# Patient Record
Sex: MALE | Race: OTHER | NOT HISPANIC OR LATINO | Employment: UNEMPLOYED | URBAN - METROPOLITAN AREA
[De-identification: names, ages, dates, MRNs, and addresses within clinical notes are randomized per-mention and may not be internally consistent; named-entity substitution may affect disease eponyms.]

---

## 2017-02-12 ENCOUNTER — HOSPITAL ENCOUNTER (EMERGENCY)
Facility: HOSPITAL | Age: 32
Discharge: HOME | End: 2017-02-12
Attending: EMERGENCY MEDICINE
Payer: MEDICARE

## 2017-02-12 VITALS
TEMPERATURE: 98 F | WEIGHT: 240 LBS | RESPIRATION RATE: 16 BRPM | BODY MASS INDEX: 36.37 KG/M2 | HEART RATE: 70 BPM | HEIGHT: 68 IN | DIASTOLIC BLOOD PRESSURE: 74 MMHG | SYSTOLIC BLOOD PRESSURE: 133 MMHG | OXYGEN SATURATION: 100 %

## 2017-02-12 DIAGNOSIS — R10.9 ABDOMINAL PAIN, UNSPECIFIED LOCATION: Primary | ICD-10-CM

## 2017-02-12 DIAGNOSIS — K29.70 GASTRITIS, PRESENCE OF BLEEDING UNSPECIFIED, UNSPECIFIED CHRONICITY, UNSPECIFIED GASTRITIS TYPE: ICD-10-CM

## 2017-02-12 DIAGNOSIS — R31.9 HEMATURIA: ICD-10-CM

## 2017-02-12 DIAGNOSIS — R93.89 ABNORMAL FINDING ON CT SCAN: ICD-10-CM

## 2017-02-12 LAB
ALBUMIN SERPL BCP-MCNC: 4.3 G/DL
ALP SERPL-CCNC: 103 U/L
ALT SERPL W/O P-5'-P-CCNC: 35 U/L
ANION GAP SERPL CALC-SCNC: 9 MMOL/L
AST SERPL-CCNC: 25 U/L
BACTERIA #/AREA URNS HPF: ABNORMAL /HPF
BASOPHILS # BLD AUTO: 0.02 K/UL
BASOPHILS NFR BLD: 0.1 %
BILIRUB SERPL-MCNC: 0.5 MG/DL
BILIRUB UR QL STRIP: NEGATIVE
BUN SERPL-MCNC: 13 MG/DL
CALCIUM SERPL-MCNC: 11.7 MG/DL
CHLORIDE SERPL-SCNC: 107 MMOL/L
CLARITY UR: ABNORMAL
CO2 SERPL-SCNC: 23 MMOL/L
COLOR UR: YELLOW
CREAT SERPL-MCNC: 1.2 MG/DL
DIFFERENTIAL METHOD: ABNORMAL
EOSINOPHIL # BLD AUTO: 0 K/UL
EOSINOPHIL NFR BLD: 0.1 %
ERYTHROCYTE [DISTWIDTH] IN BLOOD BY AUTOMATED COUNT: 13.6 %
EST. GFR  (AFRICAN AMERICAN): >60 ML/MIN/1.73 M^2
EST. GFR  (NON AFRICAN AMERICAN): >60 ML/MIN/1.73 M^2
GLUCOSE SERPL-MCNC: 120 MG/DL
GLUCOSE UR QL STRIP: NEGATIVE
HCT VFR BLD AUTO: 39.2 %
HGB BLD-MCNC: 13 G/DL
HGB UR QL STRIP: ABNORMAL
HYALINE CASTS #/AREA URNS LPF: 0 /LPF
KETONES UR QL STRIP: NEGATIVE
LEUKOCYTE ESTERASE UR QL STRIP: NEGATIVE
LIPASE SERPL-CCNC: 15 U/L
LYMPHOCYTES # BLD AUTO: 0.9 K/UL
LYMPHOCYTES NFR BLD: 6.1 %
MCH RBC QN AUTO: 26.2 PG
MCHC RBC AUTO-ENTMCNC: 33.2 %
MCV RBC AUTO: 79 FL
MICROSCOPIC COMMENT: ABNORMAL
MONOCYTES # BLD AUTO: 0.5 K/UL
MONOCYTES NFR BLD: 3.3 %
NEUTROPHILS # BLD AUTO: 13.8 K/UL
NEUTROPHILS NFR BLD: 90.2 %
NITRITE UR QL STRIP: NEGATIVE
PH UR STRIP: 6 [PH] (ref 5–8)
PLATELET # BLD AUTO: 236 K/UL
PMV BLD AUTO: 12.1 FL
POTASSIUM SERPL-SCNC: 4.1 MMOL/L
PROT SERPL-MCNC: 8 G/DL
PROT UR QL STRIP: ABNORMAL
RBC # BLD AUTO: 4.96 M/UL
RBC #/AREA URNS HPF: 30 /HPF (ref 0–4)
SODIUM SERPL-SCNC: 139 MMOL/L
SP GR UR STRIP: 1.03 (ref 1–1.03)
SQUAMOUS #/AREA URNS HPF: 3 /HPF
URN SPEC COLLECT METH UR: ABNORMAL
UROBILINOGEN UR STRIP-ACNC: NEGATIVE EU/DL
WBC # BLD AUTO: 15.27 K/UL
WBC #/AREA URNS HPF: 1 /HPF (ref 0–5)

## 2017-02-12 PROCEDURE — 96361 HYDRATE IV INFUSION ADD-ON: CPT

## 2017-02-12 PROCEDURE — 99284 EMERGENCY DEPT VISIT MOD MDM: CPT | Mod: 25

## 2017-02-12 PROCEDURE — 25000003 PHARM REV CODE 250: Performed by: NURSE PRACTITIONER

## 2017-02-12 PROCEDURE — 81000 URINALYSIS NONAUTO W/SCOPE: CPT

## 2017-02-12 PROCEDURE — 63600175 PHARM REV CODE 636 W HCPCS: Performed by: NURSE PRACTITIONER

## 2017-02-12 PROCEDURE — 80053 COMPREHEN METABOLIC PANEL: CPT

## 2017-02-12 PROCEDURE — 83690 ASSAY OF LIPASE: CPT

## 2017-02-12 PROCEDURE — 96375 TX/PRO/DX INJ NEW DRUG ADDON: CPT

## 2017-02-12 PROCEDURE — 85025 COMPLETE CBC W/AUTO DIFF WBC: CPT

## 2017-02-12 PROCEDURE — 25500020 PHARM REV CODE 255: Performed by: EMERGENCY MEDICINE

## 2017-02-12 PROCEDURE — 96374 THER/PROPH/DIAG INJ IV PUSH: CPT

## 2017-02-12 RX ORDER — ONDANSETRON 2 MG/ML
4 INJECTION INTRAMUSCULAR; INTRAVENOUS
Status: COMPLETED | OUTPATIENT
Start: 2017-02-12 | End: 2017-02-12

## 2017-02-12 RX ORDER — KETOROLAC TROMETHAMINE 30 MG/ML
15 INJECTION, SOLUTION INTRAMUSCULAR; INTRAVENOUS
Status: COMPLETED | OUTPATIENT
Start: 2017-02-12 | End: 2017-02-12

## 2017-02-12 RX ORDER — ONDANSETRON 4 MG/1
4 TABLET, ORALLY DISINTEGRATING ORAL EVERY 8 HOURS PRN
Qty: 12 TABLET | Refills: 0 | Status: SHIPPED | OUTPATIENT
Start: 2017-02-12 | End: 2020-08-05

## 2017-02-12 RX ADMIN — ONDANSETRON 4 MG: 2 INJECTION INTRAMUSCULAR; INTRAVENOUS at 01:02

## 2017-02-12 RX ADMIN — SODIUM CHLORIDE 1000 ML: 0.9 INJECTION, SOLUTION INTRAVENOUS at 01:02

## 2017-02-12 RX ADMIN — KETOROLAC TROMETHAMINE 15 MG: 30 INJECTION, SOLUTION INTRAMUSCULAR at 01:02

## 2017-02-12 RX ADMIN — IOHEXOL 80 ML: 350 INJECTION, SOLUTION INTRAVENOUS at 02:02

## 2017-02-12 NOTE — ED PROVIDER NOTES
"Encounter Date: 2/12/2017    SCRIBE #1 NOTE: I, Mira Gallardo, am scribing for, and in the presence of,  Annette Young NP. I have scribed the following portions of the note - Other sections scribed: HPI/ROS.       History     Chief Complaint   Patient presents with    Abdominal Pain     right sided, went to urgent care and sent here to r/o appendicitis     Review of patient's allergies indicates:  No Known Allergies  HPI Comments: CC: Abdominal Pain    HPI: This 31 y.o. male with anxiety and depression presents to the ED with his mother c/o right-sided abdominal pain with associated nausea that he woke with this morning.  The patient reports since waking he has had 4 episodes of emesis.  The pain is described as constant and "sharp".  Treatment was attempted with metamucil which provided no relief, so he went to urgent care who advised him to come to the ED for evaluation.  He states he is normally constipated, but he had a BM this morning.  He did not eat breakfast this morning, but denies a decreased appetite.  The patient also denies fever, diarrhea or any other associated symptoms.   His immunizations are up to date and he has no known allergies.  He has no PCP.        The history is provided by the patient.     Past Medical History   Diagnosis Date    Anxiety     Depression      Past Medical History Pertinent Negatives   Diagnosis Date Noted    Diabetes mellitus 2/12/2017    GERD (gastroesophageal reflux disease) 2/12/2017     No past surgical history on file.  No family history on file.  Social History   Substance Use Topics    Smoking status: Never Smoker    Smokeless tobacco: None    Alcohol use No     Review of Systems   Constitutional: Negative for fever.   HENT: Negative for sore throat.    Eyes: Negative for visual disturbance.   Respiratory: Negative for shortness of breath.    Cardiovascular: Negative for chest pain.   Gastrointestinal: Positive for abdominal pain, constipation, nausea and " vomiting. Negative for diarrhea.   Genitourinary: Negative for dysuria and hematuria.   Musculoskeletal: Negative for neck pain.   Skin: Negative for rash.   Neurological: Negative for headaches.       Physical Exam   Initial Vitals   BP Pulse Resp Temp SpO2   02/12/17 1206 02/12/17 1206 02/12/17 1206 02/12/17 1206 02/12/17 1206   129/78 70 20 97.4 °F (36.3 °C) 100 %     Physical Exam    Nursing note and vitals reviewed.  Constitutional: He appears well-developed and well-nourished.   HENT:   Head: Normocephalic.   Somewhat dry m/m   Eyes: Conjunctivae are normal.   Neck: Normal range of motion. Neck supple.   Cardiovascular: Normal rate, regular rhythm and normal heart sounds.   Pulmonary/Chest: Breath sounds normal. No respiratory distress.   Abdominal: Soft. He exhibits no distension and no mass. There is tenderness. There is no rebound and no guarding.   Tender over mcburrys point. No rebound at this time.    Genitourinary: Penis normal.   Genitourinary Comments: bilat testes NTTP.  No swelling, no mass   Musculoskeletal: Normal range of motion.   Neurological: He is alert and oriented to person, place, and time.   Skin: Skin is warm and dry.   Psychiatric:   Odd affect, somewhat delayed in answering questions.          ED Course   Procedures  Labs Reviewed   CBC W/ AUTO DIFFERENTIAL - Abnormal; Notable for the following:        Result Value    WBC 15.27 (*)     Hemoglobin 13.0 (*)     Hematocrit 39.2 (*)     MCV 79 (*)     MCH 26.2 (*)     Gran # 13.8 (*)     Lymph # 0.9 (*)     Gran% 90.2 (*)     Lymph% 6.1 (*)     Mono% 3.3 (*)     All other components within normal limits   COMPREHENSIVE METABOLIC PANEL - Abnormal; Notable for the following:     Glucose 120 (*)     Calcium 11.7 (*)     All other components within normal limits   URINALYSIS - Abnormal; Notable for the following:     Appearance, UA Cloudy (*)     Protein, UA 1+ (*)     Occult Blood UA 3+ (*)     All other components within normal limits    URINALYSIS MICROSCOPIC - Abnormal; Notable for the following:     RBC, UA 30 (*)     All other components within normal limits   LIPASE             Medical Decision Making:   Initial Assessment:   31yr old male, with mental health history, presents with vomiting and RLQ pain since this am  Differential Diagnosis:   Appendicitis  Gastroenteritis  Testicular torsion  ED Management:  Pts exam was positive for pain over mcburrys point but no guarding, no rebound.   pt does not appear ill or toxic and is afebrile at this time.     NS 1L, zofran and toradol given with positive relief.  Pt has not vomited since arrival in ed.     Labs and CT were reviewed and discussed with pt and mother    Based on exam today - I have low suspicion for medical, surgical or other life threatening illness although s/s of when to rtn to ed were reviewed with both mother and pt. Most likely this is a viral gastritis but mother and pt are aware of s/s to watch for and return to er for.     Pt and mother verbalizes understanding of d/c instructions and will return for worsening condition.    Case discussed with attending who agrees with assessment and plan.               Scribe Attestation:   Scribe #1: I performed the above scribed service and the documentation accurately describes the services I performed. I attest to the accuracy of the note.    Attending Attestation:           Physician Attestation for Scribe:  Physician Attestation Statement for Scribe #1: I, Annette Young NP, reviewed documentation, as scribed by Mira marinelli in my presence, and it is both accurate and complete.                 ED Course     Clinical Impression:   The primary encounter diagnosis was Abdominal pain, unspecified location. Diagnoses of Hematuria, Abnormal finding on CT scan, and Gastritis, presence of bleeding unspecified, unspecified chronicity, unspecified gastritis type were also pertinent to this visit.    Disposition:   Disposition:  Discharged  Condition: Stable       Annette Young, YENNY  02/12/17 9100

## 2017-02-12 NOTE — ED TRIAGE NOTES
Pt presents to ED c/o abd pain and nv since he woke up this morning.  States he is also constipated.

## 2017-02-12 NOTE — DISCHARGE INSTRUCTIONS
Abdominal Pain    Abdominal pain is pain in the stomach or belly area. Everyone has this pain from time to time. In many cases it goes away on its own. But abdominal pain can sometimes be due to a serious problem, such as appendicitis. So its important to know when to seek help.  Causes of abdominal pain  There are many possible causes of abdominal pain. Common causes in adults include:  · Constipation, diarrhea, or gas  · Stomach acid flowing back up into the esophagus (acid reflux or heartburn)  · Severe acid reflux, called GERD (gastroesophageal reflux disease)  · A sore in the lining of the stomach or small intestine (peptic ulcer)  · Inflammation of the gallbladder, liver, or pancreas  · Gallstones or kidney stones  · Appendicitis   · Intestinal blockage   · An internal organ pushing through a muscle or other tissue (hernia)  · Urinary tract infections  · In women, menstrual cramps, fibroids, or endometriosis  · Inflammation or infection of the intestines  Diagnosing the cause of abdominal pain  Your healthcare provider will do a physical exam help find the cause of your pain. If needed, tests will be ordered. Belly pain has many possible causes. So it can be hard to find the reason for your pain. Giving details about your pain can help. Tell your provider where and when you feel the pain, and what makes it better or worse. Also let your provider know if you have other symptoms such as:  · Fever  · Tiredness  · Upset stomach (nausea)  · Vomiting  · Changes in bathroom habits  Treating abdominal pain  Some causes of pain need emergency medical treatment right away. These include appendicitis or a bowel blockage. Other problems can be treated with rest, fluids, or medicines. Your healthcare provider can give you specific instructions for treatment or self-care based on what is causing your pain.  If you have vomiting or diarrhea, sip water or other clear fluids. When you are ready to eat solid foods again,  start with small amounts of easy-to-digest, low-fat foods. These include apple sauce, toast, or crackers.   When to seek medical care  Call 911 or go to the hospital right away if you:  · Cant pass stool and are vomiting  · Are vomiting blood or have bloody diarrhea or black, tarry diarrhea  · Have chest, neck, or shoulder pain  · Feel like you might pass out  · Have pain in your shoulder blades with nausea  · Have sudden, severe belly pain  · Have new, severe pain unlike any you have felt before  · Have a belly that is rigid, hard, and tender to touch  Call your healthcare provider if you have:  · Pain for more than 5 days  · Bloating for more than 2 days  · Diarrhea for more than 5 days  · A fever of 100.4°F (38.0°C) or higher, or as directed by your provider  · Pain that gets worse  · Weight loss for no reason  · Continued lack of appetite  · Blood in your stool  How to prevent abdominal pain  Here are some tips to help prevent abdominal pain:  · Eat smaller amounts of food at one time.  · Avoid greasy, fried, or other high-fat foods.  · Avoid foods that give you gas.  · Exercise regularly.  · Drink plenty of fluids.  To help prevent GERD symptoms:  · Quit smoking.  · Reduce alcohol and certain foods that increase stomach acid.  · Avoid aspirin and over-the-counter pain and fever medicines (NSAIDS or nonsteroidal anti-inflammatory drugs), if possible  · Lose extra weight.  · Finish eating at least 2 hours before you go to bed or lie down.  · Raise the head of your bed.  Date Last Reviewed: 7/1/2016  © 4791-2542 [employer]. [address] [place] [address], [place] [address]. All rights reserved. This information is not intended as a substitute for professional medical care. Always follow your healthcare professional's instructions.          Gastritis (Adult)    Gastritis is inflammation and irritation of the stomach lining. It can be present for a short time (acute) or be long lasting (chronic). Gastritis  is often caused by infection with bacteria called H pylori. More than a third of people in the [place] have this bacteria in their bodies. In many cases, H pylori causes no problems or symptoms. In some people, though, the infection irritates the stomach lining and causes gastritis. Other causes of stomach irritation include drinking alcohol or taking pain-relieving medicines called NSAIDs (such as aspirin or ibuprofen).   Symptoms of gastritis can include:  · Abdominal pain or bloating  · Loss of appetite  · Nausea or vomiting  · Vomiting blood or having black stools  · Feeling more tired than usual  An inflamed and irritated stomach lining is more likely to develop a sore called an ulcer. To help prevent this, gastritis should be treated.  Home care  If needed, medicines may be prescribed. If you have H pylori infection, treating it will likely relieve your symptoms. Other changes can help reduce stomach irritation and help it heal.  · If you have been prescribed medicines for H pylori infection, take them as directed. Take all of the medicine until it is finished or your healthcare provider tells you to stop, even if you feel better.  · Your healthcare provider may recommend avoiding NSAIDs. If you take daily aspirin for your heart or other medical reasons, do not stop without talking to your healthcare provider first.  · Avoid drinking alcohol.  · Stop smoking. Smoking can irritate the stomach and delay healing. As much as possible, stay away from second hand smoke.  Follow-up care  Follow up with your healthcare provider, or as advised by our staff. Testing may be needed to check for inflammation or an ulcer.  When to seek medical advice  Call your healthcare provider for any of the following:  · Stomach pain that gets worse or moves to the lower right abdomen (appendix area)  · Chest pain that appears or gets worse, or spreads to the back, neck, shoulder, or arm  · Frequent vomiting (cant keep down  liquids)  · Blood in the stool or vomit (red or black in color)  · Feeling weak or dizzy  · Fever of 100.4ºF (38ºC) or higher, or as directed by your healthcare provider  Date Last Reviewed: 6/22/2015 © 2000-2016 [employer]. [address] [place] [address]. All rights reserved. This information is not intended as a substitute for professional medical care. Always follow your healthcare professional's instructions.          Hematuria: Possible Causes     Many things can lead to blood in the urine (hematuria). The blood may be seen with the eye. Or it may only be seen when the urine is looked at under a microscope. Some of the most common causes of blood in the urine are listed below. Often, no cause for the blood can be found. This is called idiopathic hematuria.  · Kidney or bladder stones are collections of crystals. They form in the urine. Stones may be found anywhere in the urinary tract. But they form most often in the kidneys or bladder. In addition to blood in the urine, they can cause severe pain.  · BPH stands for benign prostatic hyperplasia. It is enlargement of the prostate gland. It happens as men age. BPH often causes problems with urination. It sometimes causes blood in the urine.  · A urinary tract infection (UTI) is due to bacteria growing in the urinary tract. It can cause blood in the urine. Other symptoms include burning or pain with urination. You may need to urinate often or urgently. You may also have a fever.  · Damage to the urinary tract may cause blood in the urine. This damage may be due to a blow or accident. It may also result from the use of a urinary catheter. Very hard exercise may sometimes irritate the urinary tract and cause bleeding.  · Cancer may occur anywhere in the urinary tract. A tumor may sometimes cause no symptoms other than bleeding.     Other possible causes of bleeding include:  · Prostatitis (infection of the prostate gland)  · Taking  anticoagulant medications  · Blockage in the urinary tract  · Disease or inflammation of the kidney  · Cystic diseases of the kidneys  · Sickle cell anemia  · Tumors of the urinary tract  Date Last Reviewed: 9/12/2014  © 7702-8727 [employer]. [address] [place] [address], [place] [address]. All rights reserved. This information is not intended as a substitute for professional medical care. Always follow your healthcare professional's instructions.

## 2017-02-12 NOTE — ED AVS SNAPSHOT
[facility]  [place] [address]               Luis Andujar   2017 12:22 PM   ED    Description:  Male : 1985   Department:  [facility]           Your Care was Coordinated By:     Provider Role From To    Adonis Brown MD Attending Provider [phone] --    Annette Young NP Nurse Practitioner [phone] --      Reason for Visit     Abdominal Pain           Diagnoses this Visit        Comments    Abdominal pain, unspecified location    -  Primary     Hematuria         Abnormal finding on CT scan         Gastritis, presence of bleeding unspecified, unspecified chronicity, unspecified gastritis type           ED Disposition     None           To Do List           Follow-up Information     Follow up with Sravan Miller MD In 3 days.    Specialty:  Internal Medicine    Contact information:    [address] [place] [address]  [phone]          Follow up with [facility].    Specialty:  Emergency Medicine    Why:  If symptoms worsen or any other concerns    Contact information:    [place] [address] [phone]       These Medications        Disp Refills Start End    ondansetron (ZOFRAN-ODT) 4 MG TbDL 12 tablet 0 2017     Take 1 tablet (4 mg total) by mouth every 8 (eight) hours as needed. - Oral    Pharmacy: [facility] [id number] [place] [address] [address] [facility] Ph #: [phone]         [facility]     [facility] -  Assistance  Registered nurses in the [facility] provide clinical advisement, health education, appointment booking, and other advisory services.  Call for this free service at [phone].             Medications           Message regarding Medications     Verify the changes and/or additions to your medication regime listed below are the  same as discussed with your clinician today.  If any of these changes or additions are incorrect, please notify your healthcare provider.        START taking these NEW medications        Refills    ondansetron (ZOFRAN-ODT) 4 MG TbDL 0    Sig: Take 1 tablet (4 mg total) by mouth every 8 (eight) hours as needed.    Class: Print    Route: Oral      These medications were administered today        Dose Freq    sodium chloride 0.9% bolus 1,000 mL 1,000 mL Once    Sig: Inject 1,000 mLs into the vein once.    Class: Normal    Route: Intravenous    ondansetron injection 4 mg 4 mg ED 1 Time    Sig: Inject 4 mg into the vein ED 1 Time.    Class: Normal    Route: Intravenous    ketorolac injection 15 mg 15 mg ED 1 Time    Sig: Inject 15 mg into the vein ED 1 Time.    Class: Normal    Route: Intravenous    omnipaque 350 iohexol 80 mL 80 mL IMG once as needed    Sig: Inject 80 mLs into the vein ONCE PRN for contrast.    Class: Normal    Route: Intravenous           Verify that the below list of medications is an accurate representation of the medications you are currently taking.  If none reported, the list may be blank. If incorrect, please contact your healthcare provider. Carry this list with you in case of emergency.           Current Medications     aripiprazole (ABILIFY) 20 MG Tab Take 20 mg by mouth once daily.    clotrimazole-betamethasone 1-0.05% (LOTRISONE) cream     desonide (DESOWEN) 0.05 % cream APPLY TO THE AFFECTED AREA TWICE DAILY    FLUVIRIN 3518-4604 45 mcg (15 mcg x 3)/0.5 mL Susp     hydrOXYzine (ATARAX) 25 MG tablet Take 1 tablet (25 mg total) by mouth every 8 (eight) hours as needed for Itching.    methylPREDNISolone (MEDROL DOSEPACK) 4 mg tablet Use as directed    olanzapine (ZYPREXA) 5 MG tablet Take 5 mg by mouth every evening.    ondansetron (ZOFRAN-ODT) 4 MG TbDL Take 1 tablet (4 mg total) by mouth every 8 (eight) hours as needed.           Clinical Reference Information           Your Vitals Were      "BP Pulse Temp Resp Height Weight    133/74 (BP Location: Right arm, Patient Position: Sitting, BP Method: Automatic) 70 97.9 °F (36.6 °C) (Oral) 16 5' 8" (1.727 m) 108.9 kg (240 lb)    SpO2 BMI             100% 36.49 kg/m2         Allergies as of 2/12/2017     No Known Allergies      Immunizations Administered on Date of Encounter - 2/12/2017     None      ED Micro, Lab, POCT     Start Ordered       Status Ordering Provider    02/12/17 1232 02/12/17 1232  CBC W/ AUTO DIFFERENTIAL  Once      Final result     02/12/17 1232 02/12/17 1232  Comp. Metabolic Panel  STAT      Final result     02/12/17 1232 02/12/17 1232  Lipase  STAT      Final result     02/12/17 1232 02/12/17 1232  Urinalysis - Clean Catch  STAT      Final result     02/12/17 1232 02/12/17 1232  Urinalysis Microscopic  Once      Final result       ED Imaging Orders     Start Ordered       Status Ordering Provider    02/12/17 1233 02/12/17 1232  CT Abdomen Pelvis With Contrast  1 time imaging      Final result         Discharge Instructions         Abdominal Pain    Abdominal pain is pain in the stomach or belly area. Everyone has this pain from time to time. In many cases it goes away on its own. But abdominal pain can sometimes be due to a serious problem, such as appendicitis. So its important to know when to seek help.  Causes of abdominal pain  There are many possible causes of abdominal pain. Common causes in adults include:  · Constipation, diarrhea, or gas  · Stomach acid flowing back up into the esophagus (acid reflux or heartburn)  · Severe acid reflux, called GERD (gastroesophageal reflux disease)  · A sore in the lining of the stomach or small intestine (peptic ulcer)  · Inflammation of the gallbladder, liver, or pancreas  · Gallstones or kidney stones  · Appendicitis   · Intestinal blockage   · An internal organ pushing through a muscle or other tissue (hernia)  · Urinary tract infections  · In women, menstrual cramps, fibroids, or " endometriosis  · Inflammation or infection of the intestines  Diagnosing the cause of abdominal pain  Your healthcare provider will do a physical exam help find the cause of your pain. If needed, tests will be ordered. Belly pain has many possible causes. So it can be hard to find the reason for your pain. Giving details about your pain can help. Tell your provider where and when you feel the pain, and what makes it better or worse. Also let your provider know if you have other symptoms such as:  · Fever  · Tiredness  · Upset stomach (nausea)  · Vomiting  · Changes in bathroom habits  Treating abdominal pain  Some causes of pain need emergency medical treatment right away. These include appendicitis or a bowel blockage. Other problems can be treated with rest, fluids, or medicines. Your healthcare provider can give you specific instructions for treatment or self-care based on what is causing your pain.  If you have vomiting or diarrhea, sip water or other clear fluids. When you are ready to eat solid foods again, start with small amounts of easy-to-digest, low-fat foods. These include apple sauce, toast, or crackers.   When to seek medical care  Call 911 or go to the hospital right away if you:  · Cant pass stool and are vomiting  · Are vomiting blood or have bloody diarrhea or black, tarry diarrhea  · Have chest, neck, or shoulder pain  · Feel like you might pass out  · Have pain in your shoulder blades with nausea  · Have sudden, severe belly pain  · Have new, severe pain unlike any you have felt before  · Have a belly that is rigid, hard, and tender to touch  Call your healthcare provider if you have:  · Pain for more than 5 days  · Bloating for more than 2 days  · Diarrhea for more than 5 days  · A fever of 100.4°F (38.0°C) or higher, or as directed by your provider  · Pain that gets worse  · Weight loss for no reason  · Continued lack of appetite  · Blood in your stool  How to prevent abdominal pain  Here are  some tips to help prevent abdominal pain:  · Eat smaller amounts of food at one time.  · Avoid greasy, fried, or other high-fat foods.  · Avoid foods that give you gas.  · Exercise regularly.  · Drink plenty of fluids.  To help prevent GERD symptoms:  · Quit smoking.  · Reduce alcohol and certain foods that increase stomach acid.  · Avoid aspirin and over-the-counter pain and fever medicines (NSAIDS or nonsteroidal anti-inflammatory drugs), if possible  · Lose extra weight.  · Finish eating at least 2 hours before you go to bed or lie down.  · Raise the head of your bed.  Date Last Reviewed: 7/1/2016 © 2000-2016 [employer]. [address] [place] [address], [place] [address]. All rights reserved. This information is not intended as a substitute for professional medical care. Always follow your healthcare professional's instructions.          Gastritis (Adult)    Gastritis is inflammation and irritation of the stomach lining. It can be present for a short time (acute) or be long lasting (chronic). Gastritis is often caused by infection with bacteria called H pylori. More than a third of people in the [place] have this bacteria in their bodies. In many cases, H pylori causes no problems or symptoms. In some people, though, the infection irritates the stomach lining and causes gastritis. Other causes of stomach irritation include drinking alcohol or taking pain-relieving medicines called NSAIDs (such as aspirin or ibuprofen).   Symptoms of gastritis can include:  · Abdominal pain or bloating  · Loss of appetite  · Nausea or vomiting  · Vomiting blood or having black stools  · Feeling more tired than usual  An inflamed and irritated stomach lining is more likely to develop a sore called an ulcer. To help prevent this, gastritis should be treated.  Home care  If needed, medicines may be prescribed. If you have H pylori infection, treating it will likely relieve your symptoms. Other changes can help reduce stomach  irritation and help it heal.  · If you have been prescribed medicines for H pylori infection, take them as directed. Take all of the medicine until it is finished or your healthcare provider tells you to stop, even if you feel better.  · Your healthcare provider may recommend avoiding NSAIDs. If you take daily aspirin for your heart or other medical reasons, do not stop without talking to your healthcare provider first.  · Avoid drinking alcohol.  · Stop smoking. Smoking can irritate the stomach and delay healing. As much as possible, stay away from second hand smoke.  Follow-up care  Follow up with your healthcare provider, or as advised by our staff. Testing may be needed to check for inflammation or an ulcer.  When to seek medical advice  Call your healthcare provider for any of the following:  · Stomach pain that gets worse or moves to the lower right abdomen (appendix area)  · Chest pain that appears or gets worse, or spreads to the back, neck, shoulder, or arm  · Frequent vomiting (cant keep down liquids)  · Blood in the stool or vomit (red or black in color)  · Feeling weak or dizzy  · Fever of 100.4ºF (38ºC) or higher, or as directed by your healthcare provider  Date Last Reviewed: 6/22/2015 © 2000-2016 [employer]. [address] [place] [address], [place] [address]. All rights reserved. This information is not intended as a substitute for professional medical care. Always follow your healthcare professional's instructions.          Hematuria: Possible Causes     Many things can lead to blood in the urine (hematuria). The blood may be seen with the eye. Or it may only be seen when the urine is looked at under a microscope. Some of the most common causes of blood in the urine are listed below. Often, no cause for the blood can be found. This is called idiopathic hematuria.  · Kidney or bladder stones are collections of crystals. They form in the urine. Stones may be found anywhere in the urinary tract.  But they form most often in the kidneys or bladder. In addition to blood in the urine, they can cause severe pain.  · BPH stands for benign prostatic hyperplasia. It is enlargement of the prostate gland. It happens as men age. BPH often causes problems with urination. It sometimes causes blood in the urine.  · A urinary tract infection (UTI) is due to bacteria growing in the urinary tract. It can cause blood in the urine. Other symptoms include burning or pain with urination. You may need to urinate often or urgently. You may also have a fever.  · Damage to the urinary tract may cause blood in the urine. This damage may be due to a blow or accident. It may also result from the use of a urinary catheter. Very hard exercise may sometimes irritate the urinary tract and cause bleeding.  · Cancer may occur anywhere in the urinary tract. A tumor may sometimes cause no symptoms other than bleeding.     Other possible causes of bleeding include:  · Prostatitis (infection of the prostate gland)  · Taking anticoagulant medications  · Blockage in the urinary tract  · Disease or inflammation of the kidney  · Cystic diseases of the kidneys  · Sickle cell anemia  · Tumors of the urinary tract  Date Last Reviewed: 9/12/2014  © 8742-9688 [employer]. [address] [place] [address]. All rights reserved. This information is not intended as a substitute for professional medical care. Always follow your healthcare professional's instructions.          [facility] Sign-Up     Activating your [facility] account is as easy as 1-2-3!     1) Visit [email or web address], select Sign Up Now, enter this activation code and your date of birth, then select Next.  [email or web address]  Expires: 3/29/2017  3:10 PM      2) Create a username and password to use when you visit [facility] in the future and select a security question in case you lose your password and select Next.    3) Enter your e-mail address and click Sign  Up!    Additional Information  If you have questions, please e-mail [email or web address] or call [phone] to talk to our [facility] staff. Remember, [facility] is NOT to be used for urgent needs. For medical emergencies, dial 911.          [facility] complies with applicable Federal civil rights laws and does not discriminate on the basis of race, color, national origin, age, disability, or sex.        Language Assistance Services     ATTENTION: Language assistance services are available, free of charge. Please call [phone].      ATENCIÓN: Si habla español, tiene a ivory disposición servicios gratuitos de asistencia lingüística. Llame al [phone].     CHÚ Ý: N?u b?n nói Ti?ng Vi?t, có các d?ch v? h? tr? ngôn ng? mi?n phí dành cho b?n. G?i s? [phone].

## 2017-12-27 ENCOUNTER — TELEPHONE (OUTPATIENT)
Dept: PSYCHIATRY | Facility: CLINIC | Age: 32
End: 2017-12-27

## 2017-12-27 NOTE — TELEPHONE ENCOUNTER
----- Message from Layne Springer RN sent at 12/27/2017  9:08 AM CST -----  Contact: Pt [facility] portal      ----- Message -----  From: Naa Brooke  Sent: 12/26/2017   4:27 PM  To: Nelsy PARKS Staff    Appointment Request From: Luis Andujar    With Provider: Other - (see comments)    Would Accept With:Request to see a new provider    Preferred Date Range: From 12/25/2017 To 1/4/2018    Preferred Times: Any    Reason for visit: Request an Appt    Comments:  I would like to see a Psychiatrist, Dr. Whittington if possible.  I am trying to change my provider(s) to [facility].  Please let me know if you need additional information.  Thanks    Pt can be reached at [phone].

## 2018-02-02 ENCOUNTER — TELEPHONE (OUTPATIENT)
Dept: PSYCHIATRY | Facility: CLINIC | Age: 33
End: 2018-02-02

## 2018-02-02 ENCOUNTER — TELEPHONE (OUTPATIENT)
Dept: PSYCHIATRY | Facility: HOSPITAL | Age: 33
End: 2018-02-02

## 2018-02-03 NOTE — TELEPHONE ENCOUNTER
----- Message from Sue Krishna sent at 2/2/2018  6:17 PM CST -----  Contact: PT  PT is calling regarding the need to get an appointment.   PT feels its urgent, current Dr that he has he's stating it's not working out.    Callback: [phone]

## 2018-02-03 NOTE — TELEPHONE ENCOUNTER
----- Message from Izzy Mccormack MD sent at 2/2/2018  8:42 PM CST -----  Contact: PT    Called patient back on 2/2/18 at 08:46. No answer. No voicemail set up.      ----- Message -----  From: Sue Krishna  Sent: 2/2/2018   6:17 PM  To: Ksenia Magana Staff    PT is calling regarding the need to get an appointment.   PT feels its urgent, current Dr that he has he's stating it's not working out.    Callback: [phone]

## 2018-02-03 NOTE — TELEPHONE ENCOUNTER
I paged Dr Chris Rubio, resident on call to contact pt to clarify this message and make sure pt not in need of emergency care currently.

## 2018-02-04 NOTE — TELEPHONE ENCOUNTER
Dr Christopher's telephone conversation with Mr Andujar was reviewed; he denied any suicidal ideations or need for ER evaluation.  He plans to follow up at [facility] on Monday to discuss recent med concerns.

## 2020-07-22 ENCOUNTER — PATIENT OUTREACH (OUTPATIENT)
Dept: ADMINISTRATIVE | Facility: HOSPITAL | Age: 35
End: 2020-07-22

## 2020-08-05 ENCOUNTER — OFFICE VISIT (OUTPATIENT)
Dept: FAMILY MEDICINE | Facility: CLINIC | Age: 35
End: 2020-08-05
Payer: COMMERCIAL

## 2020-08-05 ENCOUNTER — LAB VISIT (OUTPATIENT)
Dept: LAB | Facility: HOSPITAL | Age: 35
End: 2020-08-05
Attending: FAMILY MEDICINE
Payer: COMMERCIAL

## 2020-08-05 VITALS
HEART RATE: 77 BPM | HEIGHT: 68 IN | RESPIRATION RATE: 16 BRPM | OXYGEN SATURATION: 98 % | SYSTOLIC BLOOD PRESSURE: 128 MMHG | DIASTOLIC BLOOD PRESSURE: 90 MMHG | BODY MASS INDEX: 38.32 KG/M2 | TEMPERATURE: 99 F | WEIGHT: 252.88 LBS

## 2020-08-05 DIAGNOSIS — Z00.00 WELL ADULT EXAM: Primary | ICD-10-CM

## 2020-08-05 DIAGNOSIS — Z00.00 WELL ADULT EXAM: ICD-10-CM

## 2020-08-05 DIAGNOSIS — J30.9 CHRONIC ALLERGIC RHINITIS: ICD-10-CM

## 2020-08-05 LAB
25(OH)D3+25(OH)D2 SERPL-MCNC: 22 NG/ML (ref 30–96)
ALBUMIN SERPL BCP-MCNC: 4.3 G/DL (ref 3.5–5.2)
ALP SERPL-CCNC: 90 U/L (ref 55–135)
ALT SERPL W/O P-5'-P-CCNC: 29 U/L (ref 10–44)
ANION GAP SERPL CALC-SCNC: 7 MMOL/L (ref 8–16)
AST SERPL-CCNC: 21 U/L (ref 10–40)
BASOPHILS # BLD AUTO: 0.06 K/UL (ref 0–0.2)
BASOPHILS NFR BLD: 0.7 % (ref 0–1.9)
BILIRUB SERPL-MCNC: 0.3 MG/DL (ref 0.1–1)
BUN SERPL-MCNC: 7 MG/DL (ref 6–20)
CALCIUM SERPL-MCNC: 11.1 MG/DL (ref 8.7–10.5)
CHLORIDE SERPL-SCNC: 106 MMOL/L (ref 95–110)
CHOLEST SERPL-MCNC: 222 MG/DL (ref 120–199)
CHOLEST/HDLC SERPL: 4.6 {RATIO} (ref 2–5)
CO2 SERPL-SCNC: 25 MMOL/L (ref 23–29)
CREAT SERPL-MCNC: 0.9 MG/DL (ref 0.5–1.4)
DIFFERENTIAL METHOD: ABNORMAL
EOSINOPHIL # BLD AUTO: 0.3 K/UL (ref 0–0.5)
EOSINOPHIL NFR BLD: 3.9 % (ref 0–8)
ERYTHROCYTE [DISTWIDTH] IN BLOOD BY AUTOMATED COUNT: 13.3 % (ref 11.5–14.5)
EST. GFR  (AFRICAN AMERICAN): >60 ML/MIN/1.73 M^2
EST. GFR  (NON AFRICAN AMERICAN): >60 ML/MIN/1.73 M^2
GLUCOSE SERPL-MCNC: 98 MG/DL (ref 70–110)
HCT VFR BLD AUTO: 45.1 % (ref 40–54)
HDLC SERPL-MCNC: 48 MG/DL (ref 40–75)
HDLC SERPL: 21.6 % (ref 20–50)
HGB BLD-MCNC: 14 G/DL (ref 14–18)
IMM GRANULOCYTES # BLD AUTO: 0.02 K/UL (ref 0–0.04)
IMM GRANULOCYTES NFR BLD AUTO: 0.2 % (ref 0–0.5)
LDLC SERPL CALC-MCNC: 149 MG/DL (ref 63–159)
LYMPHOCYTES # BLD AUTO: 2.4 K/UL (ref 1–4.8)
LYMPHOCYTES NFR BLD: 27.2 % (ref 18–48)
MCH RBC QN AUTO: 26.6 PG (ref 27–31)
MCHC RBC AUTO-ENTMCNC: 31 G/DL (ref 32–36)
MCV RBC AUTO: 86 FL (ref 82–98)
MONOCYTES # BLD AUTO: 0.5 K/UL (ref 0.3–1)
MONOCYTES NFR BLD: 5.5 % (ref 4–15)
NEUTROPHILS # BLD AUTO: 5.4 K/UL (ref 1.8–7.7)
NEUTROPHILS NFR BLD: 62.5 % (ref 38–73)
NONHDLC SERPL-MCNC: 174 MG/DL
NRBC BLD-RTO: 0 /100 WBC
PLATELET # BLD AUTO: 248 K/UL (ref 150–350)
PMV BLD AUTO: 12.3 FL (ref 9.2–12.9)
POTASSIUM SERPL-SCNC: 4.2 MMOL/L (ref 3.5–5.1)
PROT SERPL-MCNC: 7.9 G/DL (ref 6–8.4)
RBC # BLD AUTO: 5.27 M/UL (ref 4.6–6.2)
SODIUM SERPL-SCNC: 138 MMOL/L (ref 136–145)
T4 FREE SERPL-MCNC: 1.07 NG/DL (ref 0.71–1.51)
TRIGL SERPL-MCNC: 125 MG/DL (ref 30–150)
TSH SERPL DL<=0.005 MIU/L-ACNC: 2.43 UIU/ML (ref 0.4–4)
WBC # BLD AUTO: 8.68 K/UL (ref 3.9–12.7)

## 2020-08-05 PROCEDURE — 99999 PR PBB SHADOW E&M-EST. PATIENT-LVL III: ICD-10-PCS | Mod: PBBFAC,,, | Performed by: FAMILY MEDICINE

## 2020-08-05 PROCEDURE — 80053 COMPREHEN METABOLIC PANEL: CPT

## 2020-08-05 PROCEDURE — 82306 VITAMIN D 25 HYDROXY: CPT

## 2020-08-05 PROCEDURE — 99385 PREV VISIT NEW AGE 18-39: CPT | Mod: S$GLB,,, | Performed by: FAMILY MEDICINE

## 2020-08-05 PROCEDURE — 85025 COMPLETE CBC W/AUTO DIFF WBC: CPT

## 2020-08-05 PROCEDURE — 83036 HEMOGLOBIN GLYCOSYLATED A1C: CPT

## 2020-08-05 PROCEDURE — 36415 COLL VENOUS BLD VENIPUNCTURE: CPT | Mod: PO

## 2020-08-05 PROCEDURE — 84443 ASSAY THYROID STIM HORMONE: CPT

## 2020-08-05 PROCEDURE — 84439 ASSAY OF FREE THYROXINE: CPT

## 2020-08-05 PROCEDURE — 99999 PR PBB SHADOW E&M-EST. PATIENT-LVL III: CPT | Mod: PBBFAC,,, | Performed by: FAMILY MEDICINE

## 2020-08-05 PROCEDURE — 99385 PR PREVENTIVE VISIT,NEW,18-39: ICD-10-PCS | Mod: S$GLB,,, | Performed by: FAMILY MEDICINE

## 2020-08-05 PROCEDURE — 80061 LIPID PANEL: CPT

## 2020-08-05 RX ORDER — SERTRALINE HYDROCHLORIDE 100 MG/1
TABLET, FILM COATED ORAL DAILY
COMMUNITY
Start: 2020-06-30 | End: 2021-10-22 | Stop reason: SDUPTHER

## 2020-08-05 RX ORDER — METHYLPREDNISOLONE 4 MG/1
TABLET ORAL
Qty: 1 PACKAGE | Refills: 0 | Status: SHIPPED | OUTPATIENT
Start: 2020-08-05 | End: 2020-10-16 | Stop reason: SDUPTHER

## 2020-08-05 RX ORDER — CETIRIZINE HYDROCHLORIDE 10 MG/1
CAPSULE, LIQUID FILLED ORAL
COMMUNITY

## 2020-08-06 LAB
ESTIMATED AVG GLUCOSE: 111 MG/DL (ref 68–131)
HBA1C MFR BLD HPLC: 5.5 % (ref 4–5.6)

## 2020-10-15 DIAGNOSIS — J30.9 CHRONIC ALLERGIC RHINITIS: ICD-10-CM

## 2020-10-16 RX ORDER — METHYLPREDNISOLONE 4 MG/1
TABLET ORAL
Qty: 1 PACKAGE | Refills: 0 | Status: SHIPPED | OUTPATIENT
Start: 2020-10-16 | End: 2021-10-07

## 2020-10-16 RX ORDER — METHYLPREDNISOLONE 4 MG/1
TABLET ORAL
Qty: 1 PACKAGE | Refills: 0 | OUTPATIENT
Start: 2020-10-16

## 2020-10-16 NOTE — TELEPHONE ENCOUNTER
I spoke with Dr Lombard regarding this refill request and he stated to forward to him and he would refill it

## 2021-04-26 ENCOUNTER — PATIENT MESSAGE (OUTPATIENT)
Dept: RESEARCH | Facility: HOSPITAL | Age: 36
End: 2021-04-26

## 2021-09-22 ENCOUNTER — TELEPHONE (OUTPATIENT)
Dept: FAMILY MEDICINE | Facility: CLINIC | Age: 36
End: 2021-09-22

## 2021-10-07 ENCOUNTER — OFFICE VISIT (OUTPATIENT)
Dept: PSYCHIATRY | Facility: CLINIC | Age: 36
End: 2021-10-07
Payer: COMMERCIAL

## 2021-10-07 VITALS
BODY MASS INDEX: 37.5 KG/M2 | SYSTOLIC BLOOD PRESSURE: 139 MMHG | OXYGEN SATURATION: 97 % | HEIGHT: 68 IN | WEIGHT: 247.44 LBS | DIASTOLIC BLOOD PRESSURE: 88 MMHG | HEART RATE: 98 BPM

## 2021-10-07 DIAGNOSIS — F41.9 ANXIETY: Primary | ICD-10-CM

## 2021-10-07 PROCEDURE — 3079F DIAST BP 80-89 MM HG: CPT | Mod: CPTII,S$GLB,, | Performed by: PHYSICIAN ASSISTANT

## 2021-10-07 PROCEDURE — 99205 OFFICE O/P NEW HI 60 MIN: CPT | Mod: S$GLB,,, | Performed by: PHYSICIAN ASSISTANT

## 2021-10-07 PROCEDURE — 3008F PR BODY MASS INDEX (BMI) DOCUMENTED: ICD-10-PCS | Mod: CPTII,S$GLB,, | Performed by: PHYSICIAN ASSISTANT

## 2021-10-07 PROCEDURE — 1160F RVW MEDS BY RX/DR IN RCRD: CPT | Mod: CPTII,S$GLB,, | Performed by: PHYSICIAN ASSISTANT

## 2021-10-07 PROCEDURE — 3079F PR MOST RECENT DIASTOLIC BLOOD PRESSURE 80-89 MM HG: ICD-10-PCS | Mod: CPTII,S$GLB,, | Performed by: PHYSICIAN ASSISTANT

## 2021-10-07 PROCEDURE — 3075F SYST BP GE 130 - 139MM HG: CPT | Mod: CPTII,S$GLB,, | Performed by: PHYSICIAN ASSISTANT

## 2021-10-07 PROCEDURE — 99999 PR PBB SHADOW E&M-EST. PATIENT-LVL III: CPT | Mod: PBBFAC,,, | Performed by: PHYSICIAN ASSISTANT

## 2021-10-07 PROCEDURE — 3008F BODY MASS INDEX DOCD: CPT | Mod: CPTII,S$GLB,, | Performed by: PHYSICIAN ASSISTANT

## 2021-10-07 PROCEDURE — 1159F PR MEDICATION LIST DOCUMENTED IN MEDICAL RECORD: ICD-10-PCS | Mod: CPTII,S$GLB,, | Performed by: PHYSICIAN ASSISTANT

## 2021-10-07 PROCEDURE — 99205 PR OFFICE/OUTPT VISIT, NEW, LEVL V, 60-74 MIN: ICD-10-PCS | Mod: S$GLB,,, | Performed by: PHYSICIAN ASSISTANT

## 2021-10-07 PROCEDURE — 3075F PR MOST RECENT SYSTOLIC BLOOD PRESS GE 130-139MM HG: ICD-10-PCS | Mod: CPTII,S$GLB,, | Performed by: PHYSICIAN ASSISTANT

## 2021-10-07 PROCEDURE — 1159F MED LIST DOCD IN RCRD: CPT | Mod: CPTII,S$GLB,, | Performed by: PHYSICIAN ASSISTANT

## 2021-10-07 PROCEDURE — 1160F PR REVIEW ALL MEDS BY PRESCRIBER/CLIN PHARMACIST DOCUMENTED: ICD-10-PCS | Mod: CPTII,S$GLB,, | Performed by: PHYSICIAN ASSISTANT

## 2021-10-07 PROCEDURE — 99999 PR PBB SHADOW E&M-EST. PATIENT-LVL III: ICD-10-PCS | Mod: PBBFAC,,, | Performed by: PHYSICIAN ASSISTANT

## 2021-10-14 ENCOUNTER — PATIENT MESSAGE (OUTPATIENT)
Dept: PSYCHIATRY | Facility: CLINIC | Age: 36
End: 2021-10-14

## 2021-10-18 ENCOUNTER — PATIENT MESSAGE (OUTPATIENT)
Dept: PSYCHIATRY | Facility: CLINIC | Age: 36
End: 2021-10-18

## 2021-10-22 ENCOUNTER — OFFICE VISIT (OUTPATIENT)
Dept: PSYCHIATRY | Facility: CLINIC | Age: 36
End: 2021-10-22
Payer: COMMERCIAL

## 2021-10-22 VITALS
HEART RATE: 82 BPM | WEIGHT: 249.56 LBS | DIASTOLIC BLOOD PRESSURE: 84 MMHG | OXYGEN SATURATION: 97 % | BODY MASS INDEX: 37.82 KG/M2 | HEIGHT: 68 IN | SYSTOLIC BLOOD PRESSURE: 125 MMHG

## 2021-10-22 DIAGNOSIS — F25.1 SCHIZOAFFECTIVE DISORDER, DEPRESSIVE TYPE: Primary | ICD-10-CM

## 2021-10-22 DIAGNOSIS — F41.8 OTHER SPECIFIED ANXIETY DISORDERS: ICD-10-CM

## 2021-10-22 PROCEDURE — 3074F SYST BP LT 130 MM HG: CPT | Mod: CPTII,S$GLB,, | Performed by: PHYSICIAN ASSISTANT

## 2021-10-22 PROCEDURE — 3079F DIAST BP 80-89 MM HG: CPT | Mod: CPTII,S$GLB,, | Performed by: PHYSICIAN ASSISTANT

## 2021-10-22 PROCEDURE — 99214 PR OFFICE/OUTPT VISIT, EST, LEVL IV, 30-39 MIN: ICD-10-PCS | Mod: S$GLB,,, | Performed by: PHYSICIAN ASSISTANT

## 2021-10-22 PROCEDURE — 1160F PR REVIEW ALL MEDS BY PRESCRIBER/CLIN PHARMACIST DOCUMENTED: ICD-10-PCS | Mod: CPTII,S$GLB,, | Performed by: PHYSICIAN ASSISTANT

## 2021-10-22 PROCEDURE — 1160F RVW MEDS BY RX/DR IN RCRD: CPT | Mod: CPTII,S$GLB,, | Performed by: PHYSICIAN ASSISTANT

## 2021-10-22 PROCEDURE — 3079F PR MOST RECENT DIASTOLIC BLOOD PRESSURE 80-89 MM HG: ICD-10-PCS | Mod: CPTII,S$GLB,, | Performed by: PHYSICIAN ASSISTANT

## 2021-10-22 PROCEDURE — 3074F PR MOST RECENT SYSTOLIC BLOOD PRESSURE < 130 MM HG: ICD-10-PCS | Mod: CPTII,S$GLB,, | Performed by: PHYSICIAN ASSISTANT

## 2021-10-22 PROCEDURE — 90833 PSYTX W PT W E/M 30 MIN: CPT | Mod: S$GLB,,, | Performed by: PHYSICIAN ASSISTANT

## 2021-10-22 PROCEDURE — 1159F PR MEDICATION LIST DOCUMENTED IN MEDICAL RECORD: ICD-10-PCS | Mod: CPTII,S$GLB,, | Performed by: PHYSICIAN ASSISTANT

## 2021-10-22 PROCEDURE — 90833 PR PSYCHOTHERAPY W/PATIENT W/E&M, 30 MIN (ADD ON): ICD-10-PCS | Mod: S$GLB,,, | Performed by: PHYSICIAN ASSISTANT

## 2021-10-22 PROCEDURE — 3008F PR BODY MASS INDEX (BMI) DOCUMENTED: ICD-10-PCS | Mod: CPTII,S$GLB,, | Performed by: PHYSICIAN ASSISTANT

## 2021-10-22 PROCEDURE — 1159F MED LIST DOCD IN RCRD: CPT | Mod: CPTII,S$GLB,, | Performed by: PHYSICIAN ASSISTANT

## 2021-10-22 PROCEDURE — 99999 PR PBB SHADOW E&M-EST. PATIENT-LVL III: ICD-10-PCS | Mod: PBBFAC,,, | Performed by: PHYSICIAN ASSISTANT

## 2021-10-22 PROCEDURE — 3008F BODY MASS INDEX DOCD: CPT | Mod: CPTII,S$GLB,, | Performed by: PHYSICIAN ASSISTANT

## 2021-10-22 PROCEDURE — 99214 OFFICE O/P EST MOD 30 MIN: CPT | Mod: S$GLB,,, | Performed by: PHYSICIAN ASSISTANT

## 2021-10-22 PROCEDURE — 99999 PR PBB SHADOW E&M-EST. PATIENT-LVL III: CPT | Mod: PBBFAC,,, | Performed by: PHYSICIAN ASSISTANT

## 2021-10-22 RX ORDER — ARIPIPRAZOLE 20 MG/1
20 TABLET ORAL DAILY
Qty: 90 TABLET | Refills: 3 | Status: SHIPPED | OUTPATIENT
Start: 2021-10-22 | End: 2022-05-18

## 2021-10-22 RX ORDER — OLANZAPINE 5 MG/1
5 TABLET ORAL NIGHTLY
Qty: 90 TABLET | Refills: 3 | Status: SHIPPED | OUTPATIENT
Start: 2021-10-22 | End: 2022-05-18

## 2021-10-22 RX ORDER — SERTRALINE HYDROCHLORIDE 100 MG/1
100 TABLET, FILM COATED ORAL DAILY
Qty: 90 TABLET | Refills: 3 | Status: SHIPPED | OUTPATIENT
Start: 2021-10-22 | End: 2022-06-04 | Stop reason: SDUPTHER

## 2021-10-24 ENCOUNTER — PATIENT MESSAGE (OUTPATIENT)
Dept: PSYCHIATRY | Facility: CLINIC | Age: 36
End: 2021-10-24
Payer: COMMERCIAL

## 2021-11-08 ENCOUNTER — PATIENT MESSAGE (OUTPATIENT)
Dept: PSYCHIATRY | Facility: CLINIC | Age: 36
End: 2021-11-08
Payer: COMMERCIAL

## 2022-02-12 ENCOUNTER — PATIENT MESSAGE (OUTPATIENT)
Dept: PSYCHIATRY | Facility: CLINIC | Age: 37
End: 2022-02-12
Payer: COMMERCIAL

## 2022-02-14 ENCOUNTER — PATIENT MESSAGE (OUTPATIENT)
Dept: PSYCHIATRY | Facility: CLINIC | Age: 37
End: 2022-02-14
Payer: COMMERCIAL

## 2022-03-29 ENCOUNTER — PATIENT MESSAGE (OUTPATIENT)
Dept: PSYCHIATRY | Facility: CLINIC | Age: 37
End: 2022-03-29
Payer: COMMERCIAL

## 2022-04-05 ENCOUNTER — PATIENT MESSAGE (OUTPATIENT)
Dept: PSYCHIATRY | Facility: CLINIC | Age: 37
End: 2022-04-05
Payer: COMMERCIAL

## 2022-04-13 ENCOUNTER — OFFICE VISIT (OUTPATIENT)
Dept: PSYCHIATRY | Facility: CLINIC | Age: 37
End: 2022-04-13
Payer: COMMERCIAL

## 2022-04-13 DIAGNOSIS — F41.8 OTHER SPECIFIED ANXIETY DISORDERS: ICD-10-CM

## 2022-04-13 DIAGNOSIS — F25.1 SCHIZOAFFECTIVE DISORDER, DEPRESSIVE TYPE: Primary | ICD-10-CM

## 2022-04-13 PROCEDURE — 1159F MED LIST DOCD IN RCRD: CPT | Mod: CPTII,95,, | Performed by: PHYSICIAN ASSISTANT

## 2022-04-13 PROCEDURE — 90833 PSYTX W PT W E/M 30 MIN: CPT | Mod: 95,,, | Performed by: PHYSICIAN ASSISTANT

## 2022-04-13 PROCEDURE — 1159F PR MEDICATION LIST DOCUMENTED IN MEDICAL RECORD: ICD-10-PCS | Mod: CPTII,95,, | Performed by: PHYSICIAN ASSISTANT

## 2022-04-13 PROCEDURE — 99214 PR OFFICE/OUTPT VISIT, EST, LEVL IV, 30-39 MIN: ICD-10-PCS | Mod: 95,,, | Performed by: PHYSICIAN ASSISTANT

## 2022-04-13 PROCEDURE — 1160F PR REVIEW ALL MEDS BY PRESCRIBER/CLIN PHARMACIST DOCUMENTED: ICD-10-PCS | Mod: CPTII,95,, | Performed by: PHYSICIAN ASSISTANT

## 2022-04-13 PROCEDURE — 90833 PR PSYCHOTHERAPY W/PATIENT W/E&M, 30 MIN (ADD ON): ICD-10-PCS | Mod: 95,,, | Performed by: PHYSICIAN ASSISTANT

## 2022-04-13 PROCEDURE — 1160F RVW MEDS BY RX/DR IN RCRD: CPT | Mod: CPTII,95,, | Performed by: PHYSICIAN ASSISTANT

## 2022-04-13 PROCEDURE — 99214 OFFICE O/P EST MOD 30 MIN: CPT | Mod: 95,,, | Performed by: PHYSICIAN ASSISTANT

## 2022-04-13 NOTE — PROGRESS NOTES
"Patient agrees to being observed by student via secure HIPAA compliant [employer] account.    Outpatient Psychiatry Follow-Up Visit ([place])    4/13/2022    Clinical Status of Patient:  Outpatient (Ambulatory)    Chief Complaint:  Luis Andujar is a 36 y.o. male who presents today for follow-up of mood disorder and anxiety.  Met with patient.      Interval History and Content of Current Session:  Interim Events/Subjective Report/Content of Current Session:      36 y.o. male who presents today for follow-up of mood disorder and anxiety    1. Continue Abilify 20mg daily.   2. Continue Olanzapine 5mg nightly.   3. Continue Zoloft 100mg daily.   4. Encouraged patient to volunteer in order to re-establish structure in his life.   5. F/U 3 months per pt preference    Psychotherapy:  · Target symptoms: {PSY TARGET SYMPTOMS:30236}  · Why chosen therapy is appropriate versus another modality: {reason:73495}  · Outcome monitoring methods: {methods:54399}  · Therapeutic intervention type: {types:38868}  · Topics discussed/themes: {Topics:58724}  · The patient's response to the intervention is {PSY INTERVENTION RESPONSE:58918}. The patient's progress toward treatment goals is {Progress:20262}.   · Duration of intervention: *** minutes.    Review of Systems   · {ROS:20208::"PSYCHIATRIC: Pertinant items are noted in the narrative."}    Past Medical, Family and Social History: The patient's past medical, family and social history have been reviewed and updated as appropriate within the electronic medical record - see encounter notes.    Compliance: {YES/NO:98453}    Side effects: {list:03568}    Risk Parameters:  {parameters:22331::"Patient reports no suicidal ideation","Patient reports no homicidal ideation","Patient reports no self-injurious behavior","Patient reports no violent behavior"}    Exam (detailed: at least 9 elements; comprehensive: all 15 elements)   Constitutional  Vitals:  Most recent vital signs, dated {PSY " "VITALS:18202} 90 days prior to this appointment, {WERE / WERE NOT:81075} reviewed.   There were no vitals filed for this visit.     General:  {exam; general psych:22795::"unremarkable","age appropriate"}     Musculoskeletal  Muscle Strength/Tone:  {Muscle Strength:12779}   Gait & Station:  {Gait:20211::"non-ataxic"}     Psychiatric  Speech:  {findings; speech psych:34551::"no latency; no press"}   Mood & Affect:  {Mood:73036}  {desc; affect:62602::"congruent and appropriate"}   Thought Process:  {Thought Process:20212::"normal and logical"}   Associations:  {Associations:20224::"intact"}   Thought Content:  {Thought Content:31889::"normal, no suicidality, no homicidality, delusions, or paranoia"}   Insight:  {insight:21291}   Judgement: {JUDGMENT:21292::"behavior is adequate to circumstances"}   Orientation:  {orientation:30299::"grossly intact"}   Memory: {Memory:20233::"intact for content of interview"}   Language: {EXAM; AMB PSYCH LANGUAGE:20234::"grossly intact"}   Attention Span & Concentration:  {Attention Span:20235::"able to focus"}   Fund of Knowledge:  {Knowledge:20251::"intact and appropriate to age and level of education"}     Assessment and Diagnosis   Status/Progress: Based on the examination today, the patient's problem(s) is/are {Status:15765}.  New problems {HAVE/HAVE NOT:25003} been presented today.   {obstacles:61838} {ARE/ARE NOT:81506} complicating management of the primary condition.  {Diagnosis:30319}     General Impression: ***    1. Continue Abilify 20mg daily.   2. Continue Olanzapine 5mg nightly.   3. Continue Zoloft 100mg daily.   4. Encouraged patient to volunteer in order to re-establish structure in his life.   5. F/U 3 months per pt preference      No diagnosis found.    Intervention/Counseling/Treatment Plan   · {Plan:20336}      Return to Clinic: {return:50143}    "

## 2022-04-14 NOTE — PROGRESS NOTES
Outpatient Psychiatry Follow-Up Visit ([facility])    4/13/2022    Clinical Status of Patient:  Outpatient (Ambulatory)    Chief Complaint:  Luis Andujar is a 36 y.o. male who presents today for follow-up of mood disorder and anxiety.  Met with patient.      Patient agrees to being observed by student via secure HIPAA compliant [employer] account.    The patient location is: Home in [place]  The chief complaint leading to consultation is: F/u schizoaffective disorder and anxiety    Visit type: audiovisual    Face to Face time with patient: 17 min  22 minutes of total time spent on the encounter, which includes face to face time and non-face to face time preparing to see the patient (eg, review of tests), Obtaining and/or reviewing separately obtained history, Documenting clinical information in the electronic or other health record, Independently interpreting results (not separately reported) and communicating results to the patient/family/caregiver, or Care coordination (not separately reported).         Each patient to whom he or she provides medical services by telemedicine is:  (1) informed of the relationship between the physician and patient and the respective role of any other health care provider with respect to management of the patient; and (2) notified that he or she may decline to receive medical services by telemedicine and may withdraw from such care at any time.    Notes:       Interval History and Content of Current Session:  Interim Events/Subjective Report/Content of Current Session: Patient is currently taking Abilify 20mg daily, Olanzapine 5mg nightly, Zoloft 100mg daily. Doing well and feels stable.  Is going to have a consultation with a job placement agency through his state benefii.  Wondering if he should try to get another job or if he would do better on long-term disability.  Psychotherapy:  · Target symptoms: anxiety , mood disorder  · Why chosen therapy is appropriate versus another  modality: patient responds to this modality  · Outcome monitoring methods: self-report, observation  · Therapeutic intervention type: insight oriented psychotherapy, behavior modifying psychotherapy  · Topics discussed/themes: difficulty managing affect in interpersonal relationships, building skills sets for symptom management, symptom recognition  · The patient's response to the intervention is accepting. The patient's progress toward treatment goals is good.   · Duration of intervention: 20 minutes.    Review of Systems   · PSYCHIATRIC: Pertinant items are noted in the narrative.  · CONSTITUTIONAL: No weight gain or loss.   · MUSCULOSKELETAL: No pain or stiffness of the joints.  · NEUROLOGIC: No weakness, sensory changes, seizures, confusion, memory loss, tremor or other abnormal movements.    Past Medical, Family and Social History: The patient's past medical, family and social history have been reviewed and updated as appropriate within the electronic medical record - see encounter notes.    Compliance: yes    Side effects: None    Risk Parameters:  Patient reports no suicidal ideation  Patient reports no homicidal ideation  Patient reports no self-injurious behavior  Patient reports no violent behavior    Exam (detailed: at least 9 elements; comprehensive: all 15 elements)   Constitutional  Vitals:  Most recent vital signs, dated less than 90 days prior to this appointment, were reviewed.   There were no vitals filed for this visit.     General:  unremarkable, age appropriate, casually dressed     Musculoskeletal  Muscle Strength/Tone:  no tremor, no tic   Gait & Station:  unobserved     Psychiatric  Speech:  no latency; no press, monotone   Mood & Affect:  steady, euthymic  flat   Thought Process:  normal and logical   Associations:  intact   Thought Content:  normal, no suicidality, no homicidality, delusions, or paranoia   Insight:  intact, has awareness of illness   Judgement: behavior is adequate to  circumstances.   Orientation:  grossly intact   Memory: intact for content of interview   Language: grossly intact   Attention Span & Concentration:  able to focus   Fund of Knowledge:  intact and appropriate to age and level of education     Assessment and Diagnosis   Status/Progress: Based on the examination today, the patient's problem(s) is/are well controlled.  New problems have not been presented today.   Lack of compliance are not complicating management of the primary condition.  There are no active rule-out diagnoses for this patient at this time.     General Impression: Schizoaffective disorder depressive type and anxiety disorder, stable on Abilify 20mg daily, Olanzapine 5mg daily, and Zoloft 100mg daily. He reports noticeable anxiety but does not want to alter medications. He is hesitant to try to get another job but willing to give it a chance.  Acknowledge two AAPs, pt has been stable on these for at least a few years.      ICD-10-CM ICD-9-CM   1. Schizoaffective disorder, depressive type  F25.1 295.70   2. Other specified anxiety disorders  F41.8 300.09       Intervention/Counseling/Treatment Plan   · Medication Management: The risks and benefits of medication were discussed with the patient.     1. Continue Abilify 20mg daily.   2. Continue Olanzapine 5mg nightly.   3. Continue Zoloft 100mg daily.   4. Encouraged patient to try a job placement that is low stress and we will work together to try to have him be successful.  5. F/U 3 months per pt preference.      Return to Clinic: 3 months

## 2022-04-24 ENCOUNTER — PATIENT MESSAGE (OUTPATIENT)
Dept: PSYCHIATRY | Facility: CLINIC | Age: 37
End: 2022-04-24
Payer: COMMERCIAL

## 2022-04-26 ENCOUNTER — PATIENT MESSAGE (OUTPATIENT)
Dept: PSYCHIATRY | Facility: CLINIC | Age: 37
End: 2022-04-26
Payer: COMMERCIAL

## 2022-05-13 ENCOUNTER — PATIENT MESSAGE (OUTPATIENT)
Dept: PSYCHIATRY | Facility: CLINIC | Age: 37
End: 2022-05-13
Payer: COMMERCIAL

## 2022-05-17 ENCOUNTER — PATIENT MESSAGE (OUTPATIENT)
Dept: PSYCHIATRY | Facility: CLINIC | Age: 37
End: 2022-05-17
Payer: COMMERCIAL

## 2022-05-18 ENCOUNTER — PATIENT MESSAGE (OUTPATIENT)
Dept: PSYCHIATRY | Facility: CLINIC | Age: 37
End: 2022-05-18
Payer: COMMERCIAL

## 2022-05-18 RX ORDER — ARIPIPRAZOLE 20 MG/1
20 TABLET ORAL DAILY
Qty: 90 TABLET | Refills: 3 | Status: SHIPPED | OUTPATIENT
Start: 2022-05-18 | End: 2023-02-04 | Stop reason: SDUPTHER

## 2022-06-10 ENCOUNTER — TELEPHONE (OUTPATIENT)
Dept: FAMILY MEDICINE | Facility: CLINIC | Age: 37
End: 2022-06-10

## 2022-06-10 DIAGNOSIS — E78.5 HYPERLIPIDEMIA, UNSPECIFIED HYPERLIPIDEMIA TYPE: Primary | ICD-10-CM

## 2022-06-10 DIAGNOSIS — Z00.00 WELL ADULT EXAM: ICD-10-CM

## 2022-06-10 NOTE — TELEPHONE ENCOUNTER
Pt has OV scheduled; requesting lab orders be placed, specifically would like to check for diabetes

## 2022-06-13 NOTE — TELEPHONE ENCOUNTER
Called pt to see why he cancelled appt with Julita; lab orders have been placed; LM to call office

## 2022-07-27 ENCOUNTER — PATIENT MESSAGE (OUTPATIENT)
Dept: PSYCHIATRY | Facility: CLINIC | Age: 37
End: 2022-07-27
Payer: COMMERCIAL

## 2022-08-11 ENCOUNTER — PATIENT MESSAGE (OUTPATIENT)
Dept: PSYCHIATRY | Facility: CLINIC | Age: 37
End: 2022-08-11
Payer: COMMERCIAL

## 2022-08-30 ENCOUNTER — PATIENT MESSAGE (OUTPATIENT)
Dept: PSYCHIATRY | Facility: CLINIC | Age: 37
End: 2022-08-30
Payer: COMMERCIAL

## 2022-09-22 ENCOUNTER — PATIENT MESSAGE (OUTPATIENT)
Dept: PSYCHIATRY | Facility: CLINIC | Age: 37
End: 2022-09-22
Payer: COMMERCIAL

## 2022-09-27 ENCOUNTER — OFFICE VISIT (OUTPATIENT)
Dept: PSYCHIATRY | Facility: CLINIC | Age: 37
End: 2022-09-27
Payer: COMMERCIAL

## 2022-09-27 DIAGNOSIS — F25.1 SCHIZOAFFECTIVE DISORDER, DEPRESSIVE TYPE: Primary | ICD-10-CM

## 2022-09-27 DIAGNOSIS — F41.8 OTHER SPECIFIED ANXIETY DISORDERS: ICD-10-CM

## 2022-09-27 PROCEDURE — 1159F PR MEDICATION LIST DOCUMENTED IN MEDICAL RECORD: ICD-10-PCS | Mod: CPTII,95,, | Performed by: PHYSICIAN ASSISTANT

## 2022-09-27 PROCEDURE — 90833 PR PSYCHOTHERAPY W/PATIENT W/E&M, 30 MIN (ADD ON): ICD-10-PCS | Mod: 95,,, | Performed by: PHYSICIAN ASSISTANT

## 2022-09-27 PROCEDURE — 90833 PSYTX W PT W E/M 30 MIN: CPT | Mod: 95,,, | Performed by: PHYSICIAN ASSISTANT

## 2022-09-27 PROCEDURE — 1160F RVW MEDS BY RX/DR IN RCRD: CPT | Mod: CPTII,95,, | Performed by: PHYSICIAN ASSISTANT

## 2022-09-27 PROCEDURE — 99214 OFFICE O/P EST MOD 30 MIN: CPT | Mod: 95,,, | Performed by: PHYSICIAN ASSISTANT

## 2022-09-27 PROCEDURE — 99214 PR OFFICE/OUTPT VISIT, EST, LEVL IV, 30-39 MIN: ICD-10-PCS | Mod: 95,,, | Performed by: PHYSICIAN ASSISTANT

## 2022-09-27 PROCEDURE — 1159F MED LIST DOCD IN RCRD: CPT | Mod: CPTII,95,, | Performed by: PHYSICIAN ASSISTANT

## 2022-09-27 PROCEDURE — 1160F PR REVIEW ALL MEDS BY PRESCRIBER/CLIN PHARMACIST DOCUMENTED: ICD-10-PCS | Mod: CPTII,95,, | Performed by: PHYSICIAN ASSISTANT

## 2022-09-27 RX ORDER — OLANZAPINE 5 MG/1
5 TABLET ORAL NIGHTLY
COMMUNITY
End: 2022-11-10 | Stop reason: SDUPTHER

## 2022-09-27 RX ORDER — SERTRALINE HYDROCHLORIDE 100 MG/1
200 TABLET, FILM COATED ORAL DAILY
Qty: 180 TABLET | Refills: 1 | Status: SHIPPED | OUTPATIENT
Start: 2022-09-27 | End: 2023-02-06 | Stop reason: SDUPTHER

## 2022-09-27 NOTE — PROGRESS NOTES
Outpatient Psychiatry Follow-Up Visit ([facility])    9/27/2022    Clinical Status of Patient:  Outpatient (Ambulatory)    Chief Complaint:  Luis Andujar is a 36 y.o. male who presents today for follow-up of mood disorder and anxiety.  Met with patient.        The patient location is: Home in [place]  The chief complaint leading to consultation is: F/u schizoaffective disorder and anxiety    Visit type: audiovisual    Face to Face time with patient: 17 min  22 minutes of total time spent on the encounter, which includes face to face time and non-face to face time preparing to see the patient (eg, review of tests), Obtaining and/or reviewing separately obtained history, Documenting clinical information in the electronic or other health record, Independently interpreting results (not separately reported) and communicating results to the patient/family/caregiver, or Care coordination (not separately reported).         Each patient to whom he or she provides medical services by telemedicine is:  (1) informed of the relationship between the physician and patient and the respective role of any other health care provider with respect to management of the patient; and (2) notified that he or she may decline to receive medical services by telemedicine and may withdraw from such care at any time.    Notes:       Interval History and Content of Current Session:  Interim Events/Subjective Report/Content of Current Session: Patient is currently taking Abilify 20mg daily, Olanzapine 5mg nightly, Zoloft 100mg daily. Feels stable, no psychosis, but is feeling depressed lately.  Still sleeping well.  Pursuing long term disability.  Had trouble getting in with a therapist, says he had trouble finding one with a schedule that would work, but then states that he decided not to schedule with her because he got busy.    Psychotherapy:  Target symptoms: anxiety , mood disorder  Why chosen therapy is appropriate versus another modality:  patient responds to this modality  Outcome monitoring methods: self-report, observation  Therapeutic intervention type: insight oriented psychotherapy, behavior modifying psychotherapy  Topics discussed/themes: difficulty managing affect in interpersonal relationships, building skills sets for symptom management, symptom recognition  The patient's response to the intervention is accepting. The patient's progress toward treatment goals is good.   Duration of intervention: 17 minutes.    Review of Systems   PSYCHIATRIC: Pertinant items are noted in the narrative.  CONSTITUTIONAL: No weight gain or loss.   MUSCULOSKELETAL: No pain or stiffness of the joints.  NEUROLOGIC: No weakness, sensory changes, seizures, confusion, memory loss, tremor or other abnormal movements.    Past Medical, Family and Social History: The patient's past medical, family and social history have been reviewed and updated as appropriate within the electronic medical record - see encounter notes.    Compliance: yes    Side effects: None    Risk Parameters:  Patient reports no suicidal ideation  Patient reports no homicidal ideation  Patient reports no self-injurious behavior  Patient reports no violent behavior    Exam (detailed: at least 9 elements; comprehensive: all 15 elements)   Constitutional  Vitals:  Most recent vital signs, dated less than 90 days prior to this appointment, were reviewed.   There were no vitals filed for this visit.     General:  unremarkable, age appropriate, casually dressed     Musculoskeletal  Muscle Strength/Tone:  no tremor, no tic   Gait & Station:  unobserved     Psychiatric  Speech:  no latency; no press, monotone   Mood & Affect:  steady, euthymic  flat   Thought Process:  normal and logical   Associations:  intact   Thought Content:  normal, no suicidality, no homicidality, delusions, or paranoia   Insight:  intact, has awareness of illness   Judgement: behavior is adequate to circumstances.   Orientation:   grossly intact   Memory: intact for content of interview   Language: grossly intact   Attention Span & Concentration:  able to focus   Fund of Knowledge:  intact and appropriate to age and level of education     Assessment and Diagnosis   Status/Progress: Based on the examination today, the patient's problem(s) is/are well controlled.  New problems have not been presented today.   Lack of compliance are not complicating management of the primary condition.  There are no active rule-out diagnoses for this patient at this time.     General Impression: Schizoaffective disorder depressive type and anxiety disorder, stable on Abilify 20mg daily, Olanzapine 5mg daily, and Zoloft 100mg daily. Feels depressed lately.  Will increase sertraline to 200 mg daily. Acknowledge two AAPs, pt has been stable on these for at least a few years.      ICD-10-CM ICD-9-CM   1. Schizoaffective disorder, depressive type  F25.1 295.70   2. Other specified anxiety disorders  F41.8 300.09       Intervention/Counseling/Treatment Plan   Medication Management: The risks and benefits of medication were discussed with the patient.     1. Increase Zoloft to 200 mg daily. Risks/bebefits/side effects discussed.  2. Continue Abilify 20mg daily.   3. Continue Olanzapine 5mg nightly.   4. Recommended pt schedule with the therapist he found and make the appointment time work.  5. Challenged pt to get out of the house at least once per day.  6. F/U 4 months per pt preference and as already scheduled      Return to Clinic: as scheduled

## 2022-10-12 ENCOUNTER — LAB VISIT (OUTPATIENT)
Dept: LAB | Facility: HOSPITAL | Age: 37
End: 2022-10-12
Attending: PHYSICIAN ASSISTANT
Payer: COMMERCIAL

## 2022-10-12 ENCOUNTER — OFFICE VISIT (OUTPATIENT)
Dept: FAMILY MEDICINE | Facility: CLINIC | Age: 37
End: 2022-10-12
Payer: COMMERCIAL

## 2022-10-12 VITALS
BODY MASS INDEX: 38.52 KG/M2 | WEIGHT: 254.19 LBS | DIASTOLIC BLOOD PRESSURE: 90 MMHG | SYSTOLIC BLOOD PRESSURE: 130 MMHG | HEART RATE: 83 BPM | HEIGHT: 68 IN | OXYGEN SATURATION: 97 % | TEMPERATURE: 98 F

## 2022-10-12 DIAGNOSIS — Z00.00 WELL ADULT EXAM: ICD-10-CM

## 2022-10-12 DIAGNOSIS — J30.9 ALLERGIC RHINITIS, UNSPECIFIED SEASONALITY, UNSPECIFIED TRIGGER: ICD-10-CM

## 2022-10-12 DIAGNOSIS — E78.5 HYPERLIPIDEMIA, UNSPECIFIED HYPERLIPIDEMIA TYPE: ICD-10-CM

## 2022-10-12 DIAGNOSIS — L29.9 PRURITUS: Primary | ICD-10-CM

## 2022-10-12 LAB
ALBUMIN SERPL BCP-MCNC: 4 G/DL (ref 3.5–5.2)
ALP SERPL-CCNC: 72 U/L (ref 55–135)
ALT SERPL W/O P-5'-P-CCNC: 24 U/L (ref 10–44)
ANION GAP SERPL CALC-SCNC: 7 MMOL/L (ref 8–16)
AST SERPL-CCNC: 27 U/L (ref 10–40)
BASOPHILS # BLD AUTO: 0.07 K/UL (ref 0–0.2)
BASOPHILS NFR BLD: 0.6 % (ref 0–1.9)
BILIRUB SERPL-MCNC: 0.4 MG/DL (ref 0.1–1)
BUN SERPL-MCNC: 14 MG/DL (ref 6–20)
CALCIUM SERPL-MCNC: 11.4 MG/DL (ref 8.7–10.5)
CHLORIDE SERPL-SCNC: 107 MMOL/L (ref 95–110)
CHOLEST SERPL-MCNC: 214 MG/DL (ref 120–199)
CHOLEST/HDLC SERPL: 5.5 {RATIO} (ref 2–5)
CO2 SERPL-SCNC: 26 MMOL/L (ref 23–29)
CREAT SERPL-MCNC: 0.8 MG/DL (ref 0.5–1.4)
DIFFERENTIAL METHOD: ABNORMAL
EOSINOPHIL # BLD AUTO: 0.4 K/UL (ref 0–0.5)
EOSINOPHIL NFR BLD: 3.9 % (ref 0–8)
ERYTHROCYTE [DISTWIDTH] IN BLOOD BY AUTOMATED COUNT: 13.1 % (ref 11.5–14.5)
EST. GFR  (NO RACE VARIABLE): >60 ML/MIN/1.73 M^2
ESTIMATED AVG GLUCOSE: 108 MG/DL (ref 68–131)
GLUCOSE SERPL-MCNC: 117 MG/DL (ref 70–110)
HBA1C MFR BLD: 5.4 % (ref 4–5.6)
HCT VFR BLD AUTO: 45.2 % (ref 40–54)
HDLC SERPL-MCNC: 39 MG/DL (ref 40–75)
HDLC SERPL: 18.2 % (ref 20–50)
HGB BLD-MCNC: 13.8 G/DL (ref 14–18)
IMM GRANULOCYTES # BLD AUTO: 0.03 K/UL (ref 0–0.04)
IMM GRANULOCYTES NFR BLD AUTO: 0.3 % (ref 0–0.5)
LDLC SERPL CALC-MCNC: 101 MG/DL (ref 63–159)
LYMPHOCYTES # BLD AUTO: 3 K/UL (ref 1–4.8)
LYMPHOCYTES NFR BLD: 26.5 % (ref 18–48)
MCH RBC QN AUTO: 26.7 PG (ref 27–31)
MCHC RBC AUTO-ENTMCNC: 30.5 G/DL (ref 32–36)
MCV RBC AUTO: 87 FL (ref 82–98)
MONOCYTES # BLD AUTO: 0.7 K/UL (ref 0.3–1)
MONOCYTES NFR BLD: 6.3 % (ref 4–15)
NEUTROPHILS # BLD AUTO: 7 K/UL (ref 1.8–7.7)
NEUTROPHILS NFR BLD: 62.4 % (ref 38–73)
NONHDLC SERPL-MCNC: 175 MG/DL
NRBC BLD-RTO: 0 /100 WBC
PLATELET # BLD AUTO: 266 K/UL (ref 150–450)
PMV BLD AUTO: 12.1 FL (ref 9.2–12.9)
POTASSIUM SERPL-SCNC: 3.9 MMOL/L (ref 3.5–5.1)
PROT SERPL-MCNC: 7.7 G/DL (ref 6–8.4)
RBC # BLD AUTO: 5.17 M/UL (ref 4.6–6.2)
SODIUM SERPL-SCNC: 140 MMOL/L (ref 136–145)
TRIGL SERPL-MCNC: 370 MG/DL (ref 30–150)
WBC # BLD AUTO: 11.14 K/UL (ref 3.9–12.7)

## 2022-10-12 PROCEDURE — 3044F PR MOST RECENT HEMOGLOBIN A1C LEVEL <7.0%: ICD-10-PCS | Mod: CPTII,S$GLB,, | Performed by: PHYSICIAN ASSISTANT

## 2022-10-12 PROCEDURE — 1160F PR REVIEW ALL MEDS BY PRESCRIBER/CLIN PHARMACIST DOCUMENTED: ICD-10-PCS | Mod: CPTII,S$GLB,, | Performed by: PHYSICIAN ASSISTANT

## 2022-10-12 PROCEDURE — 80053 COMPREHEN METABOLIC PANEL: CPT | Performed by: PHYSICIAN ASSISTANT

## 2022-10-12 PROCEDURE — 99999 PR PBB SHADOW E&M-EST. PATIENT-LVL III: CPT | Mod: PBBFAC,,, | Performed by: PHYSICIAN ASSISTANT

## 2022-10-12 PROCEDURE — 3075F PR MOST RECENT SYSTOLIC BLOOD PRESS GE 130-139MM HG: ICD-10-PCS | Mod: CPTII,S$GLB,, | Performed by: PHYSICIAN ASSISTANT

## 2022-10-12 PROCEDURE — 80061 LIPID PANEL: CPT | Performed by: PHYSICIAN ASSISTANT

## 2022-10-12 PROCEDURE — 85025 COMPLETE CBC W/AUTO DIFF WBC: CPT | Performed by: PHYSICIAN ASSISTANT

## 2022-10-12 PROCEDURE — 36415 COLL VENOUS BLD VENIPUNCTURE: CPT | Mod: PO | Performed by: PHYSICIAN ASSISTANT

## 2022-10-12 PROCEDURE — 3080F PR MOST RECENT DIASTOLIC BLOOD PRESSURE >= 90 MM HG: ICD-10-PCS | Mod: CPTII,S$GLB,, | Performed by: PHYSICIAN ASSISTANT

## 2022-10-12 PROCEDURE — 1159F PR MEDICATION LIST DOCUMENTED IN MEDICAL RECORD: ICD-10-PCS | Mod: CPTII,S$GLB,, | Performed by: PHYSICIAN ASSISTANT

## 2022-10-12 PROCEDURE — 1160F RVW MEDS BY RX/DR IN RCRD: CPT | Mod: CPTII,S$GLB,, | Performed by: PHYSICIAN ASSISTANT

## 2022-10-12 PROCEDURE — 3080F DIAST BP >= 90 MM HG: CPT | Mod: CPTII,S$GLB,, | Performed by: PHYSICIAN ASSISTANT

## 2022-10-12 PROCEDURE — 99214 PR OFFICE/OUTPT VISIT, EST, LEVL IV, 30-39 MIN: ICD-10-PCS | Mod: S$GLB,,, | Performed by: PHYSICIAN ASSISTANT

## 2022-10-12 PROCEDURE — 1159F MED LIST DOCD IN RCRD: CPT | Mod: CPTII,S$GLB,, | Performed by: PHYSICIAN ASSISTANT

## 2022-10-12 PROCEDURE — 3044F HG A1C LEVEL LT 7.0%: CPT | Mod: CPTII,S$GLB,, | Performed by: PHYSICIAN ASSISTANT

## 2022-10-12 PROCEDURE — 83036 HEMOGLOBIN GLYCOSYLATED A1C: CPT | Performed by: PHYSICIAN ASSISTANT

## 2022-10-12 PROCEDURE — 99999 PR PBB SHADOW E&M-EST. PATIENT-LVL III: ICD-10-PCS | Mod: PBBFAC,,, | Performed by: PHYSICIAN ASSISTANT

## 2022-10-12 PROCEDURE — 99214 OFFICE O/P EST MOD 30 MIN: CPT | Mod: S$GLB,,, | Performed by: PHYSICIAN ASSISTANT

## 2022-10-12 PROCEDURE — 3075F SYST BP GE 130 - 139MM HG: CPT | Mod: CPTII,S$GLB,, | Performed by: PHYSICIAN ASSISTANT

## 2022-10-12 RX ORDER — HYDROXYZINE HYDROCHLORIDE 25 MG/1
25 TABLET, FILM COATED ORAL 3 TIMES DAILY PRN
Qty: 20 TABLET | Refills: 0 | Status: SHIPPED | OUTPATIENT
Start: 2022-10-12 | End: 2023-10-05

## 2022-10-12 RX ORDER — COVID-19 MOLECULAR TEST ASSAY
KIT MISCELLANEOUS
COMMUNITY
Start: 2022-09-12 | End: 2022-11-29

## 2022-10-12 RX ORDER — FLUTICASONE PROPIONATE 50 MCG
1 SPRAY, SUSPENSION (ML) NASAL 2 TIMES DAILY
Qty: 16 G | Refills: 0 | Status: SHIPPED | OUTPATIENT
Start: 2022-10-12 | End: 2023-07-31 | Stop reason: SDUPTHER

## 2022-10-12 NOTE — PROGRESS NOTES
"Subjective:       Patient ID: Lusi Andujar is a 36 y.o. male.    Chief Complaint: Itching    37 y/o male with anxiety and schizoaffective disorder presents for diffuse itching and sinus symptoms x4-6 weeks.  Itching is diffuse to entire body and tends to be migratory without any rashes. No new soaps, lotions, detergents.  Sinus sx include nasal congestion, rhinorrhea, post nasal drip, cough, sore throat, and intermittent pressure HA.  Negative sx include SOB, CP, fever, n/v/d, abd pn, feeling of tongue/throat/mouth swelling.  Benadryl and [employer] saline nasal spray have provided sx relief.  Symptoms have been persistent prior to recent medication adjustment with psychiatry.  He has hx of jock itch for which he uses ketoconazole cream prn for sx.    Pt requested labs for reassurance that he has no "internal bacteria or viruses" that may be causing his symptoms.    Review of Systems   Constitutional:  Negative for fatigue, fever and unexpected weight change.   HENT:  Positive for nasal congestion, postnasal drip, rhinorrhea, sinus pressure/congestion, sneezing and sore throat. Negative for drooling, ear pain, facial swelling, mouth dryness, nosebleeds, trouble swallowing and voice change.         No swelling of tongue, throat, or mouth   Eyes:  Negative for pain and eye dryness.   Respiratory:  Positive for cough. Negative for choking, chest tightness, shortness of breath and wheezing.    Cardiovascular:  Negative for chest pain and palpitations.   Gastrointestinal:  Negative for abdominal pain, constipation, diarrhea, nausea and vomiting.   Genitourinary:  Negative for difficulty urinating, penile pain and testicular pain.   Musculoskeletal:  Negative for arthralgias and myalgias.   Integumentary:  Positive for rash (jock itch- chronic, intermittent). Negative for color change.   Neurological:  Positive for headaches. Negative for dizziness and light-headedness.   All other systems reviewed and are " negative.      Objective:      Physical Exam  Vitals and nursing note reviewed. Exam conducted with a chaperone present.   Constitutional:       General: He is not in acute distress.     Appearance: He is not ill-appearing.   HENT:      Head: Normocephalic and atraumatic.      Right Ear: Tympanic membrane normal.      Left Ear: Tympanic membrane normal.      Mouth/Throat:      Mouth: Mucous membranes are moist.      Pharynx: Oropharynx is clear. No oropharyngeal exudate or posterior oropharyngeal erythema.   Eyes:      General: No scleral icterus.     Conjunctiva/sclera: Conjunctivae normal.   Cardiovascular:      Rate and Rhythm: Normal rate and regular rhythm.      Heart sounds: Normal heart sounds. No murmur heard.  Pulmonary:      Effort: Pulmonary effort is normal.      Breath sounds: Normal breath sounds. No wheezing, rhonchi or rales.   Abdominal:      General: Bowel sounds are normal.      Palpations: Abdomen is soft.      Tenderness: There is no abdominal tenderness. There is no guarding or rebound.   Genitourinary:     Pubic Area: No rash.       Penis: Normal.       Testes: Normal.      Comments: No rash  Musculoskeletal:      Cervical back: Normal range of motion and neck supple. No tenderness.   Lymphadenopathy:      Cervical: No cervical adenopathy.   Skin:     Coloration: Skin is not jaundiced.      Findings: No lesion or rash.      Comments: No rashes noted   Neurological:      General: No focal deficit present.      Mental Status: He is alert and oriented to person, place, and time.      Cranial Nerves: No cranial nerve deficit.      Motor: No weakness.   Psychiatric:         Mood and Affect: Mood normal.         Behavior: Behavior normal.         Thought Content: Thought content normal.         Judgment: Judgment normal.       Assessment:       Problem List Items Addressed This Visit    None  Visit Diagnoses       Pruritus    -  Primary    Relevant Medications    hydrOXYzine HCL (ATARAX) 25 MG  tablet    Allergic rhinitis, unspecified seasonality, unspecified trigger        Relevant Medications    fluticasone propionate (FLONASE) 50 mcg/actuation nasal spray              Plan:       Luis was seen today for itching.    Diagnoses and all orders for this visit:    Pruritus  -     hydrOXYzine HCL (ATARAX) 25 MG tablet; Take 1 tablet (25 mg total) by mouth 3 (three) times daily as needed for Itching (can cause drowsiness).  - Once Hydroxyzine rx is finished, can take zyrtec prn for pruritic sx.  - Avoid hot showers which can make skin feel more pruritic. Get soap with a moisturizer in it. Daily moisturize s/p bathing.  - Counseled that cooler, drier weather can cause dry skin.  - Continue ketoconazole cream prn for red, scaly rash to groin or axillae.  -     will assess labs    Allergic rhinitis, unspecified seasonality, unspecified trigger  -     fluticasone propionate (FLONASE) 50 mcg/actuation nasal spray; 1 spray (50 mcg total) by Each Nostril route 2 (two) times daily.  - Continue nasal saline prn for sx tx.       Answered all questions and pt expressed understanding. Will contact pt with test results.      Abby Bhatti PA-S2    I hereby acknowledge that I am relying upon documentation authored by a PA student working under my supervision and further I hereby attest that I have verified the student documentation or findings by personally re-performing the physical exam and medical decision making activities of the Evaluation and Management service to be billed.  Julita Ellis PAc

## 2022-10-13 ENCOUNTER — TELEPHONE (OUTPATIENT)
Dept: FAMILY MEDICINE | Facility: CLINIC | Age: 37
End: 2022-10-13
Payer: COMMERCIAL

## 2022-10-13 DIAGNOSIS — E83.52 HYPERCALCEMIA: Primary | ICD-10-CM

## 2022-10-13 NOTE — TELEPHONE ENCOUNTER
Pt inform pt his calcium is elevated. I have ordered more labs for him to have done.   Cholesterol is elevated he needs to cut back on fatty foods, junk foods, sugars and sweets

## 2022-10-30 ENCOUNTER — PATIENT MESSAGE (OUTPATIENT)
Dept: PSYCHIATRY | Facility: CLINIC | Age: 37
End: 2022-10-30
Payer: COMMERCIAL

## 2022-11-10 ENCOUNTER — PATIENT MESSAGE (OUTPATIENT)
Dept: PSYCHIATRY | Facility: CLINIC | Age: 37
End: 2022-11-10
Payer: COMMERCIAL

## 2022-11-10 RX ORDER — OLANZAPINE 2.5 MG/1
2.5 TABLET ORAL NIGHTLY
Qty: 90 TABLET | Refills: 1 | Status: SHIPPED | OUTPATIENT
Start: 2022-11-10 | End: 2022-11-10 | Stop reason: SDUPTHER

## 2022-11-22 ENCOUNTER — PATIENT MESSAGE (OUTPATIENT)
Dept: FAMILY MEDICINE | Facility: CLINIC | Age: 37
End: 2022-11-22
Payer: COMMERCIAL

## 2022-11-28 ENCOUNTER — PATIENT MESSAGE (OUTPATIENT)
Dept: PSYCHIATRY | Facility: CLINIC | Age: 37
End: 2022-11-28
Payer: COMMERCIAL

## 2022-11-29 ENCOUNTER — OFFICE VISIT (OUTPATIENT)
Dept: PSYCHIATRY | Facility: CLINIC | Age: 37
End: 2022-11-29
Payer: COMMERCIAL

## 2022-11-29 DIAGNOSIS — F41.9 ANXIETY: ICD-10-CM

## 2022-11-29 DIAGNOSIS — F25.1 SCHIZOAFFECTIVE DISORDER, DEPRESSIVE TYPE: Primary | ICD-10-CM

## 2022-11-29 PROCEDURE — 3044F PR MOST RECENT HEMOGLOBIN A1C LEVEL <7.0%: ICD-10-PCS | Mod: CPTII,95,, | Performed by: PHYSICIAN ASSISTANT

## 2022-11-29 PROCEDURE — 1160F PR REVIEW ALL MEDS BY PRESCRIBER/CLIN PHARMACIST DOCUMENTED: ICD-10-PCS | Mod: CPTII,95,, | Performed by: PHYSICIAN ASSISTANT

## 2022-11-29 PROCEDURE — 1159F PR MEDICATION LIST DOCUMENTED IN MEDICAL RECORD: ICD-10-PCS | Mod: CPTII,95,, | Performed by: PHYSICIAN ASSISTANT

## 2022-11-29 PROCEDURE — 99215 OFFICE O/P EST HI 40 MIN: CPT | Mod: 95,,, | Performed by: PHYSICIAN ASSISTANT

## 2022-11-29 PROCEDURE — 1160F RVW MEDS BY RX/DR IN RCRD: CPT | Mod: CPTII,95,, | Performed by: PHYSICIAN ASSISTANT

## 2022-11-29 PROCEDURE — 99215 PR OFFICE/OUTPT VISIT, EST, LEVL V, 40-54 MIN: ICD-10-PCS | Mod: 95,,, | Performed by: PHYSICIAN ASSISTANT

## 2022-11-29 PROCEDURE — 3044F HG A1C LEVEL LT 7.0%: CPT | Mod: CPTII,95,, | Performed by: PHYSICIAN ASSISTANT

## 2022-11-29 PROCEDURE — 1159F MED LIST DOCD IN RCRD: CPT | Mod: CPTII,95,, | Performed by: PHYSICIAN ASSISTANT

## 2022-11-29 NOTE — PROGRESS NOTES
Outpatient Psychiatry Follow-Up Visit ([facility])    11/29/2022    Clinical Status of Patient:  Outpatient (Ambulatory)    Chief Complaint:  Luis Andujar is a 37 y.o. male who presents today for follow-up of mood disorder and anxiety.  Met with patient.        The patient location is: Home in [place]  The chief complaint leading to consultation is: F/u schizoaffective disorder and anxiety    Visit type: audiovisual    Face to Face time with patient: 10 min  22 minutes of total time spent on the encounter, which includes face to face time and non-face to face time preparing to see the patient (eg, review of tests), Obtaining and/or reviewing separately obtained history, Documenting clinical information in the electronic or other health record, Independently interpreting results (not separately reported) and communicating results to the patient/family/caregiver, or Care coordination (not separately reported).         Each patient to whom he or she provides medical services by telemedicine is:  (1) informed of the relationship between the physician and patient and the respective role of any other health care provider with respect to management of the patient; and (2) notified that he or she may decline to receive medical services by telemedicine and may withdraw from such care at any time.    Notes:       Interval History and Content of Current Session:  Interim Events/Subjective Report/Content of Current Session: Patient is currently taking Abilify 20mg daily, Olanzapine 2.5 mg nightly, Zoloft 200 mg daily.  Increase Zoloft last visit.  Feeling less depressed.  Still feeling a little bit edgy and anxious.  Would like to try back for Vraylar, insurance did not cover so he has been titrating up on Abilify.  Still taking olanzapine 2.5 mg at night, reports it is not helping him sleep, and has caused weight gain and elevated lipids, most recent triglycerides markedly elevated.  Started tell the therapy and is doing well  so far.  Working on getting out of the house to take walks and keeping up with chores.      Review of Systems   PSYCHIATRIC: Pertinant items are noted in the narrative.  CONSTITUTIONAL: No weight gain or loss.   MUSCULOSKELETAL: No pain or stiffness of the joints.  NEUROLOGIC: No weakness, sensory changes, seizures, confusion, memory loss, tremor or other abnormal movements.    Past Medical, Family and Social History: The patient's past medical, family and social history have been reviewed and updated as appropriate within the electronic medical record - see encounter notes.    Compliance: yes    Side effects: None    Risk Parameters:  Patient reports no suicidal ideation  Patient reports no homicidal ideation  Patient reports no self-injurious behavior  Patient reports no violent behavior    Exam (detailed: at least 9 elements; comprehensive: all 15 elements)   Constitutional  Vitals:  Most recent vital signs, dated less than 90 days prior to this appointment, were reviewed.   There were no vitals filed for this visit.     General:  unremarkable, age appropriate, casually dressed     Musculoskeletal  Muscle Strength/Tone:  no tremor, no tic   Gait & Station:  unobserved     Psychiatric  Speech:  no latency; no press, monotone   Mood & Affect:  steady, euthymic  flat   Thought Process:  normal and logical   Associations:  intact   Thought Content:  normal, no suicidality, no homicidality, delusions, or paranoia   Insight:  intact, has awareness of illness   Judgement: behavior is adequate to circumstances.   Orientation:  grossly intact   Memory: intact for content of interview   Language: grossly intact   Attention Span & Concentration:  able to focus   Fund of Knowledge:  intact and appropriate to age and level of education     Assessment and Diagnosis   Status/Progress: Based on the examination today, the patient's problem(s) is/are well controlled.  New problems have not been presented today.   Lack of  compliance are not complicating management of the primary condition.  There are no active rule-out diagnoses for this patient at this time.     General Impression: Schizoaffective disorder depressive type and anxiety disorder, depression well treated with sertraline 200 mg daily.  Patient feels anxious and a little edgy with the Abilify 20 mg at olanzapine 2.5 mg, is interested in trying again to see if insurance will cover Vraylar now that he has failed Abilify.  Olanzapine even at this low of a dose is causing weight gain and elevated triglycerides.      ICD-10-CM ICD-9-CM   1. Schizoaffective disorder, depressive type  F25.1 295.70   2. Anxiety  F41.9 300.00         Intervention/Counseling/Treatment Plan   Medication Management: The risks and benefits of medication were discussed with the patient.     1. Cross tapering Abilify to Vraylar.  Abilify 10 mg plus Vraylar 1.5 mg daily x1 week, then Vraylar 3 mg daily.  I have explained the risks, benefits, and alternatives of medication treatment in detail. We specifically discussed risks and benefits of medication treatment, including but not limited to, headache, nausea, GI upset,  potential for metabolic syndrome: appetite stimulation, weight gain, dysregulation of blood sugar, diabetes, increase in lipids . Patient voices understanding and all questions have been answered. Patient agrees to treatment plan and medication trial.    2. Discontinue olanzapine.  Glucose and lipids monitored by PCP.  3. Continue Zoloft to 200 mg daily. Risks/bebefits/side effects discussed.  4. Continue tele therapy.  5. Challenged pt to get out of the house at least twice per day.  6. F/U 1 month    Return to Clinic: 1 month    I have personally reviewed records from multiple other providers as noted in the patient's chart- Notes from PCP  I have independently reviewed and interpreted multiple diagnostic results as listed in the chart.- Labs from PCP

## 2022-12-01 ENCOUNTER — PATIENT MESSAGE (OUTPATIENT)
Dept: PSYCHIATRY | Facility: CLINIC | Age: 37
End: 2022-12-01
Payer: COMMERCIAL

## 2022-12-02 ENCOUNTER — PATIENT MESSAGE (OUTPATIENT)
Dept: PSYCHIATRY | Facility: CLINIC | Age: 37
End: 2022-12-02
Payer: COMMERCIAL

## 2022-12-06 ENCOUNTER — PATIENT MESSAGE (OUTPATIENT)
Dept: FAMILY MEDICINE | Facility: CLINIC | Age: 37
End: 2022-12-06
Payer: COMMERCIAL

## 2023-01-06 ENCOUNTER — OFFICE VISIT (OUTPATIENT)
Dept: PSYCHIATRY | Facility: CLINIC | Age: 38
End: 2023-01-06
Payer: COMMERCIAL

## 2023-01-06 ENCOUNTER — PATIENT MESSAGE (OUTPATIENT)
Dept: PSYCHIATRY | Facility: CLINIC | Age: 38
End: 2023-01-06
Payer: COMMERCIAL

## 2023-01-06 DIAGNOSIS — F25.1 SCHIZOAFFECTIVE DISORDER, DEPRESSIVE TYPE: Primary | ICD-10-CM

## 2023-01-06 DIAGNOSIS — F41.9 ANXIETY: ICD-10-CM

## 2023-01-06 PROCEDURE — 1160F RVW MEDS BY RX/DR IN RCRD: CPT | Mod: CPTII,95,, | Performed by: PHYSICIAN ASSISTANT

## 2023-01-06 PROCEDURE — 1159F MED LIST DOCD IN RCRD: CPT | Mod: CPTII,95,, | Performed by: PHYSICIAN ASSISTANT

## 2023-01-06 PROCEDURE — 99214 OFFICE O/P EST MOD 30 MIN: CPT | Mod: 95,,, | Performed by: PHYSICIAN ASSISTANT

## 2023-01-06 PROCEDURE — 1159F PR MEDICATION LIST DOCUMENTED IN MEDICAL RECORD: ICD-10-PCS | Mod: CPTII,95,, | Performed by: PHYSICIAN ASSISTANT

## 2023-01-06 PROCEDURE — 1160F PR REVIEW ALL MEDS BY PRESCRIBER/CLIN PHARMACIST DOCUMENTED: ICD-10-PCS | Mod: CPTII,95,, | Performed by: PHYSICIAN ASSISTANT

## 2023-01-06 PROCEDURE — 99214 PR OFFICE/OUTPT VISIT, EST, LEVL IV, 30-39 MIN: ICD-10-PCS | Mod: 95,,, | Performed by: PHYSICIAN ASSISTANT

## 2023-01-09 ENCOUNTER — PATIENT MESSAGE (OUTPATIENT)
Dept: PSYCHIATRY | Facility: CLINIC | Age: 38
End: 2023-01-09
Payer: COMMERCIAL

## 2023-01-09 RX ORDER — OLANZAPINE 2.5 MG/1
2.5 TABLET ORAL NIGHTLY
COMMUNITY
End: 2023-02-06 | Stop reason: SDUPTHER

## 2023-01-30 NOTE — PROGRESS NOTES
Outpatient Psychiatry Follow-Up Visit ([facility])    1/6/2023    Clinical Status of Patient:  Outpatient (Ambulatory)    Chief Complaint:  Luis Andujar is a 37 y.o. male who presents today for follow-up of mood disorder and anxiety.  Met with patient.        The patient location is: Home in [place]  The chief complaint leading to consultation is: F/u schizoaffective disorder and anxiety    Visit type: audiovisual    Face to Face time with patient: 10 min  22 minutes of total time spent on the encounter, which includes face to face time and non-face to face time preparing to see the patient (eg, review of tests), Obtaining and/or reviewing separately obtained history, Documenting clinical information in the electronic or other health record, Independently interpreting results (not separately reported) and communicating results to the patient/family/caregiver, or Care coordination (not separately reported).         Each patient to whom he or she provides medical services by telemedicine is:  (1) informed of the relationship between the physician and patient and the respective role of any other health care provider with respect to management of the patient; and (2) notified that he or she may decline to receive medical services by telemedicine and may withdraw from such care at any time.    Notes:       Interval History and Content of Current Session:  Interim Events/Subjective Report/Content of Current Session: Patient with schizoaffective disorder depressive type and anxiety is currently taking Abilify 20mg daily, Olanzapine 2.5 mg nightly, Zoloft 200 mg daily.  Last visit we attempted to cross taper Abilify to Vraylar, but it was too expensive even with insurance.  Patient requested to go back on Abilify 20 mg without the olanzapine, but experienced insomnia, so requested to go back on the olanzapine 2.5 mg nightly.  Today he reports he is doing well, would like to continue this medication regimen.  Is having  appointment with his career counselor for social security soon and he is looking forward to that.  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    Review of Systems   PSYCHIATRIC: Pertinant items are noted in the narrative.  CONSTITUTIONAL: No weight gain or loss.   MUSCULOSKELETAL: No pain or stiffness of the joints.  NEUROLOGIC: No weakness, sensory changes, seizures, confusion, memory loss, tremor or other abnormal movements.    Past Medical, Family and Social History: The patient's past medical, family and social history have been reviewed and updated as appropriate within the electronic medical record - see encounter notes.    Compliance: yes    Side effects: None    Risk Parameters:  Patient reports no suicidal ideation  Patient reports no homicidal ideation  Patient reports no self-injurious behavior  Patient reports no violent behavior    Exam (detailed: at least 9 elements; comprehensive: all 15 elements)   Constitutional  Vitals:  Most recent vital signs, dated less than 90 days prior to this appointment, were reviewed.   There were no vitals filed for this visit.     General:  unremarkable, age appropriate, casually dressed     Musculoskeletal  Muscle Strength/Tone:  not examined   Gait & Station:  unobserved     Psychiatric  Speech:  no latency; no press, monotone   Mood & Affect:  steady, euthymic  flat   Thought Process:  normal and logical   Associations:  intact   Thought Content:  normal, no suicidality, no homicidality, delusions, or paranoia   Insight:  intact, has awareness of illness   Judgement: behavior is adequate to circumstances.   Orientation:  grossly intact   Memory: intact for content of interview   Language: grossly intact   Attention Span & Concentration:  able to focus   Fund of Knowledge:  intact and appropriate to age and level of education     Assessment and Diagnosis   Status/Progress: Based on the examination today, the patient's problem(s) is/are well  controlled.  New problems have not been presented today.   Lack of compliance are not complicating management of the primary condition.  There are no active rule-out diagnoses for this patient at this time.     General Impression: Schizoaffective disorder depressive type and anxiety disorder, depression well treated with sertraline 200 mg daily, Abilify 20 mg daily, and olanzapine 2.5 mg nightly.  Would like to continue current regimen.  Discussed that a job with routine, that is simple, repetitive, and not dealing with a lot of people would be ideal.      ICD-10-CM ICD-9-CM   1. Schizoaffective disorder, depressive type  F25.1 295.70   2. Anxiety  F41.9 300.00           Intervention/Counseling/Treatment Plan   Medication Management: The risks and benefits of medication were discussed with the patient.     1. Continue sertraline 200 mg daily, Abilify 20 mg daily, olanzapine 2.5 mg nightly.  Lab parameters monitored by PCP.    2. F/U 3 months    Return to Clinic: 3 months         Oral Minoxidil Pregnancy And Lactation Text: This medication should only be used when clearly needed if you are pregnant, attempting to become pregnant or breast feeding.

## 2023-02-04 ENCOUNTER — PATIENT MESSAGE (OUTPATIENT)
Dept: PSYCHIATRY | Facility: CLINIC | Age: 38
End: 2023-02-04
Payer: COMMERCIAL

## 2023-02-04 DIAGNOSIS — F25.1 SCHIZOAFFECTIVE DISORDER, DEPRESSIVE TYPE: ICD-10-CM

## 2023-02-04 DIAGNOSIS — F41.9 ANXIETY: Primary | ICD-10-CM

## 2023-02-06 RX ORDER — OLANZAPINE 2.5 MG/1
2.5 TABLET ORAL NIGHTLY
Qty: 90 TABLET | Refills: 3 | Status: SHIPPED | OUTPATIENT
Start: 2023-02-06 | End: 2023-09-25 | Stop reason: SDUPTHER

## 2023-02-06 RX ORDER — SERTRALINE HYDROCHLORIDE 100 MG/1
200 TABLET, FILM COATED ORAL DAILY
Qty: 180 TABLET | Refills: 3 | Status: SHIPPED | OUTPATIENT
Start: 2023-02-06 | End: 2023-03-30 | Stop reason: SDUPTHER

## 2023-02-06 RX ORDER — ARIPIPRAZOLE 20 MG/1
20 TABLET ORAL DAILY
Qty: 90 TABLET | Refills: 3 | Status: SHIPPED | OUTPATIENT
Start: 2023-02-06 | End: 2023-03-30 | Stop reason: SDUPTHER

## 2023-03-09 ENCOUNTER — PATIENT MESSAGE (OUTPATIENT)
Dept: FAMILY MEDICINE | Facility: CLINIC | Age: 38
End: 2023-03-09
Payer: COMMERCIAL

## 2023-05-11 ENCOUNTER — OFFICE VISIT (OUTPATIENT)
Dept: PSYCHIATRY | Facility: CLINIC | Age: 38
End: 2023-05-11
Payer: COMMERCIAL

## 2023-05-11 DIAGNOSIS — F25.1 SCHIZOAFFECTIVE DISORDER, DEPRESSIVE TYPE: Primary | ICD-10-CM

## 2023-05-11 DIAGNOSIS — F41.9 ANXIETY: ICD-10-CM

## 2023-05-11 PROCEDURE — 1160F PR REVIEW ALL MEDS BY PRESCRIBER/CLIN PHARMACIST DOCUMENTED: ICD-10-PCS | Mod: CPTII,95,, | Performed by: PHYSICIAN ASSISTANT

## 2023-05-11 PROCEDURE — 1159F PR MEDICATION LIST DOCUMENTED IN MEDICAL RECORD: ICD-10-PCS | Mod: CPTII,95,, | Performed by: PHYSICIAN ASSISTANT

## 2023-05-11 PROCEDURE — 1160F RVW MEDS BY RX/DR IN RCRD: CPT | Mod: CPTII,95,, | Performed by: PHYSICIAN ASSISTANT

## 2023-05-11 PROCEDURE — 99214 PR OFFICE/OUTPT VISIT, EST, LEVL IV, 30-39 MIN: ICD-10-PCS | Mod: 95,,, | Performed by: PHYSICIAN ASSISTANT

## 2023-05-11 PROCEDURE — 1159F MED LIST DOCD IN RCRD: CPT | Mod: CPTII,95,, | Performed by: PHYSICIAN ASSISTANT

## 2023-05-11 PROCEDURE — 99214 OFFICE O/P EST MOD 30 MIN: CPT | Mod: 95,,, | Performed by: PHYSICIAN ASSISTANT

## 2023-05-11 NOTE — PROGRESS NOTES
Outpatient Psychiatry Follow-Up Visit ([facility])    5/11/2023    Clinical Status of Patient:  Outpatient (Ambulatory)    Chief Complaint:  Luis Andujar is a 37 y.o. male who presents today for follow-up of mood disorder and anxiety.  Met with patient.        The patient location is: Home in [place] at address on record  The chief complaint leading to consultation is: F/u schizoaffective disorder and anxiety    Visit type: audiovisual    Face to Face time with patient: 11 min  17 minutes of total time spent on the encounter, which includes face to face time and non-face to face time preparing to see the patient (eg, review of tests), Obtaining and/or reviewing separately obtained history, Documenting clinical information in the electronic or other health record, Independently interpreting results (not separately reported) and communicating results to the patient/family/caregiver, or Care coordination (not separately reported).         Each patient to whom he or she provides medical services by telemedicine is:  (1) informed of the relationship between the physician and patient and the respective role of any other health care provider with respect to management of the patient; and (2) notified that he or she may decline to receive medical services by telemedicine and may withdraw from such care at any time.    Notes:       Interval History and Content of Current Session:  Interim Events/Subjective Report/Content of Current Session: Patient with schizoaffective disorder depressive type and anxiety is currently taking Abilify 20mg daily, Olanzapine 2.5 mg nightly, Zoloft 200 mg daily.  Doing well, states he is working on losing weight by exercising and dieting, has not seen any changes yet.  Also states he is working on getting control of his nasal congestion.  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  Parents are retiring soon and pt is contemplating getting his own apartment vs  moving to [place] with sister.  Would like to stay on the current medications.  Last lipids were not good over a year ago.    Review of Systems   PSYCHIATRIC: Pertinant items are noted in the narrative.  CONSTITUTIONAL: No weight gain or loss.   MUSCULOSKELETAL: No pain or stiffness of the joints.  NEUROLOGIC: No weakness, sensory changes, seizures, confusion, memory loss, tremor or other abnormal movements.    Past Medical, Family and Social History: The patient's past medical, family and social history have been reviewed and updated as appropriate within the electronic medical record - see encounter notes.    Compliance: yes    Side effects: None    Risk Parameters:  Patient reports no suicidal ideation  Patient reports no homicidal ideation  Patient reports no self-injurious behavior  Patient reports no violent behavior    Exam (detailed: at least 9 elements; comprehensive: all 15 elements)   Constitutional  Vitals:  Most recent vital signs, dated less than 90 days prior to this appointment, were reviewed.   There were no vitals filed for this visit.     General:  unremarkable, age appropriate, casually dressed     Musculoskeletal  Muscle Strength/Tone:  not examined   Gait & Station:  unobserved     Psychiatric  Speech:  no latency; no press, monotone   Mood & Affect:  steady, euthymic  flat   Thought Process:  normal and logical   Associations:  intact   Thought Content:  normal, no suicidality, no homicidality, delusions, or paranoia   Insight:  intact, has awareness of illness   Judgement: behavior is adequate to circumstances.   Orientation:  grossly intact   Memory: intact for content of interview   Language: grossly intact   Attention Span & Concentration:  able to focus   Fund of Knowledge:  intact and appropriate to age and level of education     Assessment and Diagnosis   Status/Progress: Based on the examination today, the patient's problem(s) is/are well controlled.  New problems have not been presented  today.   Lack of compliance are not complicating management of the primary condition.  There are no active rule-out diagnoses for this patient at this time.     General Impression: Schizoaffective disorder depressive type and anxiety disorder, depression well treated with sertraline 200 mg daily, Abilify 20 mg daily, and olanzapine 2.5 mg nightly.  Would like to continue current regimen.  Ordered repeat lipid panel, pt will need f/u with PCP for possible cholesterol medication likely.      ICD-10-CM ICD-9-CM   1. Schizoaffective disorder, depressive type  F25.1 295.70   2. Anxiety  F41.9 300.00     1. Continue sertraline 200 mg daily, Abilify 20 mg daily, olanzapine 2.5 mg nightly.    2. Lipid panel ordered and instructions given to pt.  No hx of elevated a1c  3. F/U 3 months    Intervention/Counseling/Treatment Plan   Medication Management: The risks and benefits of medication were discussed with the patient.         Return to Clinic: 3 months

## 2023-05-29 RX ORDER — ARIPIPRAZOLE 20 MG/1
20 TABLET ORAL DAILY
Qty: 90 TABLET | Refills: 3 | Status: SHIPPED | OUTPATIENT
Start: 2023-05-29 | End: 2023-09-25 | Stop reason: SDUPTHER

## 2023-06-27 ENCOUNTER — PATIENT MESSAGE (OUTPATIENT)
Dept: PSYCHIATRY | Facility: CLINIC | Age: 38
End: 2023-06-27
Payer: COMMERCIAL

## 2023-07-14 ENCOUNTER — PATIENT MESSAGE (OUTPATIENT)
Dept: PSYCHIATRY | Facility: CLINIC | Age: 38
End: 2023-07-14
Payer: COMMERCIAL

## 2023-07-31 DIAGNOSIS — J30.9 ALLERGIC RHINITIS, UNSPECIFIED SEASONALITY, UNSPECIFIED TRIGGER: ICD-10-CM

## 2023-07-31 RX ORDER — FLUTICASONE PROPIONATE 50 MCG
1 SPRAY, SUSPENSION (ML) NASAL 2 TIMES DAILY
Qty: 16 G | Refills: 0 | Status: SHIPPED | OUTPATIENT
Start: 2023-07-31

## 2023-09-03 ENCOUNTER — PATIENT MESSAGE (OUTPATIENT)
Dept: PSYCHIATRY | Facility: CLINIC | Age: 38
End: 2023-09-03
Payer: COMMERCIAL

## 2023-09-25 ENCOUNTER — OFFICE VISIT (OUTPATIENT)
Dept: PSYCHIATRY | Facility: CLINIC | Age: 38
End: 2023-09-25
Payer: COMMERCIAL

## 2023-09-25 DIAGNOSIS — F25.1 SCHIZOAFFECTIVE DISORDER, DEPRESSIVE TYPE: ICD-10-CM

## 2023-09-25 DIAGNOSIS — F41.9 ANXIETY: ICD-10-CM

## 2023-09-25 PROCEDURE — 99214 PR OFFICE/OUTPT VISIT, EST, LEVL IV, 30-39 MIN: ICD-10-PCS | Mod: 95,,, | Performed by: PHYSICIAN ASSISTANT

## 2023-09-25 PROCEDURE — 99214 OFFICE O/P EST MOD 30 MIN: CPT | Mod: 95,,, | Performed by: PHYSICIAN ASSISTANT

## 2023-09-25 PROCEDURE — 1160F PR REVIEW ALL MEDS BY PRESCRIBER/CLIN PHARMACIST DOCUMENTED: ICD-10-PCS | Mod: CPTII,95,, | Performed by: PHYSICIAN ASSISTANT

## 2023-09-25 PROCEDURE — 1159F PR MEDICATION LIST DOCUMENTED IN MEDICAL RECORD: ICD-10-PCS | Mod: CPTII,95,, | Performed by: PHYSICIAN ASSISTANT

## 2023-09-25 PROCEDURE — 1160F RVW MEDS BY RX/DR IN RCRD: CPT | Mod: CPTII,95,, | Performed by: PHYSICIAN ASSISTANT

## 2023-09-25 PROCEDURE — 1159F MED LIST DOCD IN RCRD: CPT | Mod: CPTII,95,, | Performed by: PHYSICIAN ASSISTANT

## 2023-09-25 RX ORDER — ARIPIPRAZOLE 20 MG/1
20 TABLET ORAL DAILY
Qty: 90 TABLET | Refills: 3 | Status: SHIPPED | OUTPATIENT
Start: 2023-09-25 | End: 2024-09-24

## 2023-09-25 RX ORDER — OLANZAPINE 2.5 MG/1
2.5 TABLET ORAL NIGHTLY
Qty: 90 TABLET | Refills: 3 | Status: SHIPPED | OUTPATIENT
Start: 2023-09-25 | End: 2024-09-24

## 2023-09-25 RX ORDER — SERTRALINE HYDROCHLORIDE 100 MG/1
200 TABLET, FILM COATED ORAL DAILY
Qty: 180 TABLET | Refills: 3 | Status: SHIPPED | OUTPATIENT
Start: 2023-09-25 | End: 2024-09-24

## 2023-09-25 NOTE — PROGRESS NOTES
Outpatient Psychiatry Follow-Up Visit ([facility])    9/25/2023    Clinical Status of Patient:  Outpatient (Ambulatory)    Chief Complaint:  Luis Andujar is a 37 y.o. male who presents today for follow-up of mood disorder and anxiety.  Met with patient.        The patient location is: Home in [place] at address on record  The chief complaint leading to consultation is: F/u schizoaffective disorder and anxiety    Visit type: audiovisual    Face to Face time with patient: 4 min  10 minutes of total time spent on the encounter, which includes face to face time and non-face to face time preparing to see the patient (eg, review of tests), Obtaining and/or reviewing separately obtained history, Documenting clinical information in the electronic or other health record, Independently interpreting results (not separately reported) and communicating results to the patient/family/caregiver, or Care coordination (not separately reported).         Each patient to whom he or she provides medical services by telemedicine is:  (1) informed of the relationship between the physician and patient and the respective role of any other health care provider with respect to management of the patient; and (2) notified that he or she may decline to receive medical services by telemedicine and may withdraw from such care at any time.    Notes:       Interval History and Content of Current Session:  Interim Events/Subjective Report/Content of Current Session: Patient with schizoaffective disorder depressive type and anxiety is currently taking Abilify 20mg daily, Olanzapine 2.5 mg nightly, Zoloft 200 mg daily.  Still doing well, states his parents are out of town so he's taking care of the house himself and doing fine.  No complaints today.  Did not get his lipid panel done.    Review of Systems   PSYCHIATRIC: Pertinant items are noted in the narrative.  CONSTITUTIONAL: No weight gain or loss.   MUSCULOSKELETAL: No pain or stiffness of the  joints.  NEUROLOGIC: No weakness, sensory changes, seizures, confusion, memory loss, tremor or other abnormal movements.    Past Medical, Family and Social History: The patient's past medical, family and social history have been reviewed and updated as appropriate within the electronic medical record - see encounter notes.    Compliance: yes    Side effects: None    Risk Parameters:  Patient reports no suicidal ideation  Patient reports no homicidal ideation  Patient reports no self-injurious behavior  Patient reports no violent behavior    Exam (detailed: at least 9 elements; comprehensive: all 15 elements)   Constitutional  Vitals:  Most recent vital signs, dated less than 90 days prior to this appointment, were reviewed.   There were no vitals filed for this visit.     General:  unremarkable, age appropriate, casually dressed     Musculoskeletal  Muscle Strength/Tone:  not examined   Gait & Station:  unobserved     Psychiatric  Speech:  no latency; no press, monotone   Mood & Affect:  steady, euthymic  flat   Thought Process:  normal and logical   Associations:  intact   Thought Content:  normal, no suicidality, no homicidality, delusions, or paranoia   Insight:  intact, has awareness of illness   Judgement: behavior is adequate to circumstances.   Orientation:  grossly intact   Memory: intact for content of interview   Language: grossly intact   Attention Span & Concentration:  able to focus   Fund of Knowledge:  intact and appropriate to age and level of education     Assessment and Diagnosis   Status/Progress: Based on the examination today, the patient's problem(s) is/are well controlled.  New problems have not been presented today.   Lack of compliance are not complicating management of the primary condition.  There are no active rule-out diagnoses for this patient at this time.     General Impression: Schizoaffective disorder depressive type and anxiety disorder, depression well treated with sertraline 200  mg daily, Abilify 20 mg daily, and olanzapine 2.5 mg nightly.  Would like to continue current regimen.  Ordered repeat lipid panel, pt will need f/u with PCP for possible cholesterol medication likely.  Reminded pt of lipid panel order.      ICD-10-CM ICD-9-CM   1. Anxiety  F41.9 300.00   2. Schizoaffective disorder, depressive type  F25.1 295.70   3. Pruritus  L29.9 698.9       1. Continue sertraline 200 mg daily, Abilify 20 mg daily, olanzapine 2.5 mg nightly.    2. Lipid panel ordered and instructions given to pt.  No hx of elevated a1c  3. F/U 3 months    Intervention/Counseling/Treatment Plan   Medication Management: The risks and benefits of medication were discussed with the patient.         Return to Clinic: 3 months

## 2023-09-26 ENCOUNTER — LAB VISIT (OUTPATIENT)
Dept: LAB | Facility: HOSPITAL | Age: 38
End: 2023-09-26
Attending: PHYSICIAN ASSISTANT
Payer: COMMERCIAL

## 2023-09-26 DIAGNOSIS — E83.52 HYPERCALCEMIA: ICD-10-CM

## 2023-09-26 DIAGNOSIS — F25.1 SCHIZOAFFECTIVE DISORDER, DEPRESSIVE TYPE: ICD-10-CM

## 2023-09-26 LAB
CALCIUM SERPL-MCNC: 10.5 MG/DL (ref 8.7–10.5)
CHOLEST SERPL-MCNC: 240 MG/DL (ref 120–199)
CHOLEST/HDLC SERPL: 5.6 {RATIO} (ref 2–5)
HDLC SERPL-MCNC: 43 MG/DL (ref 40–75)
HDLC SERPL: 17.9 % (ref 20–50)
LDLC SERPL CALC-MCNC: 161 MG/DL (ref 63–159)
NONHDLC SERPL-MCNC: 197 MG/DL
PTH-INTACT SERPL-MCNC: 119.8 PG/ML (ref 9–77)
TRIGL SERPL-MCNC: 180 MG/DL (ref 30–150)

## 2023-09-26 PROCEDURE — 83970 ASSAY OF PARATHORMONE: CPT | Performed by: PHYSICIAN ASSISTANT

## 2023-09-26 PROCEDURE — 82310 ASSAY OF CALCIUM: CPT | Performed by: PHYSICIAN ASSISTANT

## 2023-09-26 PROCEDURE — 82652 VIT D 1 25-DIHYDROXY: CPT | Performed by: PHYSICIAN ASSISTANT

## 2023-09-26 PROCEDURE — 80061 LIPID PANEL: CPT | Performed by: PHYSICIAN ASSISTANT

## 2023-09-27 ENCOUNTER — PATIENT MESSAGE (OUTPATIENT)
Dept: FAMILY MEDICINE | Facility: CLINIC | Age: 38
End: 2023-09-27
Payer: COMMERCIAL

## 2023-09-27 DIAGNOSIS — E21.3 HYPERPARATHYROIDISM: Primary | ICD-10-CM

## 2023-09-28 ENCOUNTER — PATIENT MESSAGE (OUTPATIENT)
Dept: FAMILY MEDICINE | Facility: CLINIC | Age: 38
End: 2023-09-28
Payer: COMMERCIAL

## 2023-09-29 LAB — 1,25(OH)2D3 SERPL-MCNC: 41 PG/ML (ref 20–79)

## 2023-10-05 ENCOUNTER — PATIENT MESSAGE (OUTPATIENT)
Dept: FAMILY MEDICINE | Facility: CLINIC | Age: 38
End: 2023-10-05
Payer: COMMERCIAL

## 2023-10-05 ENCOUNTER — OFFICE VISIT (OUTPATIENT)
Dept: ENDOCRINOLOGY | Facility: CLINIC | Age: 38
End: 2023-10-05
Payer: COMMERCIAL

## 2023-10-05 DIAGNOSIS — E83.52 SERUM CALCIUM ELEVATED: ICD-10-CM

## 2023-10-05 DIAGNOSIS — E21.3 HYPERPARATHYROIDISM: ICD-10-CM

## 2023-10-05 PROCEDURE — 99204 PR OFFICE/OUTPT VISIT, NEW, LEVL IV, 45-59 MIN: ICD-10-PCS | Mod: 95,,, | Performed by: INTERNAL MEDICINE

## 2023-10-05 PROCEDURE — 99204 OFFICE O/P NEW MOD 45 MIN: CPT | Mod: 95,,, | Performed by: INTERNAL MEDICINE

## 2023-10-05 NOTE — ASSESSMENT & PLAN NOTE
Patient noted to have hypercalcemia since 2017 (1 normal calcium level prior to this noted in 2010).  Most recent calcium is at the upper limit of normal.  He denies any history of lithium use or thiazide diuretics.      Suspect primary hyperparathyroidism.  Will need to complete workup with renal function panel, PTH, 25 hydroxy vitamin-D, 24 hour urine calcium and creatinine.  Given young age if labs confirm primary hyperparathyroidism will proceed with imaging for localization and baseline bone density scan..      Advised patient to stay well hydrated drinking 2-3 L of water per day.

## 2023-10-05 NOTE — PROGRESS NOTES
ENDOCRINOLOGY INITIAL VISIT  10/05/2023    The patient location is: home    Visit type: audiovisual    Face to Face time with patient: 20  30 minutes of total time spent on the encounter, which includes face to face time and non-face to face time preparing to see the patient (eg, review of tests), Obtaining and/or reviewing separately obtained history, Documenting clinical information in the electronic or other health record, Independently interpreting results (not separately reported) and communicating results to the patient/family/caregiver, or Care coordination (not separately reported).     Each patient to whom he or she provides medical services by telemedicine is:  (1) informed of the relationship between the physician and patient and the respective role of any other health care provider with respect to management of the patient; and (2) notified that he or she may decline to receive medical services by telemedicine and may withdraw from such care at any time.    The patient's last visit with me was on Visit date not found.     Reason for Visit:  referred by Julita Ellis PA-C for evaluation of hypercalcemia    HPI:  Luis Andujar is a 37 y.o. male with hx of anxiety/depression    With regards to the hypercalcemia or primary hyperparathyroidism:    Was found to have hypercalcemia on routine labs - first noted: in 2017 on routine labs  Pth was checked:  elevared with high normal Ca, hx of vitD deficiency     Lab Results   Component Value Date    CALCIUM 10.5 09/26/2023    ALBUMIN 4.0 10/12/2022    ESTGFRAFRICA >60.0 08/05/2020    EGFRNONAA >60.0 08/05/2020    ALKPHOS 72 10/12/2022    FQXVZARG39NZ 22 (L) 08/05/2020    .8 (H) 09/26/2023    TSH 2.426 08/05/2020       She denied current or past lithium use  Denies HCTZ use.    Parathyroid imaging: none  Thyroid ultrasound:  none     Daily intake of calcium is: no supplement  Taking vitamin D: none     + constipation for a few weeks  Stable depression on  medication    Denies polyuria/polydipsia  Denies fractures, height loss or kidney stones    Last bmd was: not done          Current Outpatient Medications:     ARIPiprazole (ABILIFY) 20 MG Tab, Take 1 tablet (20 mg total) by mouth once daily., Disp: 90 tablet, Rfl: 3    cetirizine (ZYRTEC) 10 mg Cap, as needed. , Disp: , Rfl:     fluticasone propionate (FLONASE) 50 mcg/actuation nasal spray, 1 spray (50 mcg total) by Each Nostril route 2 (two) times daily., Disp: 16 g, Rfl: 0    hydrOXYzine HCL (ATARAX) 25 MG tablet, Take 1 tablet (25 mg total) by mouth 3 (three) times daily as needed for Itching (can cause drowsiness)., Disp: 20 tablet, Rfl: 0    OLANZapine (ZYPREXA) 2.5 MG tablet, Take 1 tablet (2.5 mg total) by mouth every evening., Disp: 90 tablet, Rfl: 3    sertraline (ZOLOFT) 100 MG tablet, Take 2 tablets (200 mg total) by mouth once daily., Disp: 180 tablet, Rfl: 3    triamcinolone acetonide (NASAL ALLERGY NASL), , Disp: , Rfl:       ROS: see HPI       PHYSICAL EXAM  There were no vitals taken for this visit.  Wt Readings from Last 3 Encounters:   10/12/22 115.3 kg (254 lb 3.1 oz)   08/05/20 114.7 kg (252 lb 13.9 oz)   02/12/17 108.9 kg (240 lb)   ]    Constitutional:  Pleasant,  in no acute distress.   HENT:   Eyes:     No scleral icterus.   Respiratory:   Effort normal   Neurological:  normal speech  Psych:  Normal mood and affect.        IMAGING STUDIES    ASSESSMENT/PLAN    Problem List Items Addressed This Visit          Unprioritized    Serum calcium elevated     Patient noted to have hypercalcemia since 2017 (1 normal calcium level prior to this noted in 2010).  Most recent calcium is at the upper limit of normal.  He denies any history of lithium use or thiazide diuretics.      Suspect primary hyperparathyroidism.  Will need to complete workup with renal function panel, PTH, 25 hydroxy vitamin-D, 24 hour urine calcium and creatinine.  Given young age if labs confirm primary hyperparathyroidism will  proceed with imaging for localization and baseline bone density scan..      Advised patient to stay well hydrated drinking 2-3 L of water per day.          Other Visit Diagnoses       Hyperparathyroidism        Relevant Orders    Vitamin D    Renal Function Panel    Creatinine Clearance, Timed    Calcium, Timed Urine    PTH, Intact          Virtual in 6 month(s)  Please coordinate lab in the next week, will need to turn in 24H urine then      Abril Ga MD

## 2023-10-05 NOTE — PATIENT INSTRUCTIONS
HOW TO COLLECT AN ACCURATE   24 HOUR URINE SPECIMEN     I want you to collect EVERY drop of your urine during a 24 hour period. I do not care what the volume of the urine is as long as it represents every drop that you pass during that 24 hour period. If you need to have a bowel movement, you may separate the urine but I want every drop.     Begin the collection on a morning which is convenient for you.      The morning you start the urine collection when you wake up in the morning, pee and flush it down the toilet and note the exact time. Now you have an empty bladder and empty bottle. That starts the collection. Collect every drop after that all during the day and night until exactly the same time the next morning, when you should pass your urine and add it to the bottle. Please store the urine container in the fridge or in an ice bucket.    Bring the closed container back to the laboratory with the provided order slip.

## 2023-10-16 ENCOUNTER — PATIENT MESSAGE (OUTPATIENT)
Dept: ENDOCRINOLOGY | Facility: CLINIC | Age: 38
End: 2023-10-16
Payer: COMMERCIAL

## 2023-10-16 ENCOUNTER — LAB VISIT (OUTPATIENT)
Dept: LAB | Facility: HOSPITAL | Age: 38
End: 2023-10-16
Attending: PHYSICIAN ASSISTANT
Payer: COMMERCIAL

## 2023-10-16 DIAGNOSIS — E21.3 HYPERPARATHYROIDISM: ICD-10-CM

## 2023-10-16 LAB
25(OH)D3+25(OH)D2 SERPL-MCNC: 20 NG/ML (ref 30–96)
ALBUMIN SERPL BCP-MCNC: 4.1 G/DL (ref 3.5–5.2)
ANION GAP SERPL CALC-SCNC: 10 MMOL/L (ref 8–16)
BUN SERPL-MCNC: 11 MG/DL (ref 6–20)
CALCIUM 24H UR-MRATE: 4 MG/HR (ref 4–12)
CALCIUM SERPL-MCNC: 10.2 MG/DL (ref 8.7–10.5)
CALCIUM UR-MCNC: 4.1 MG/DL (ref 0–15)
CALCIUM URINE (MG/SPEC): 107 MG/SPEC
CHLORIDE SERPL-SCNC: 107 MMOL/L (ref 95–110)
CO2 SERPL-SCNC: 23 MMOL/L (ref 23–29)
CREAT CL/1.73 SQ M 12H UR+SERPL-ARVRAT: 44 ML/MIN (ref 70–110)
CREAT SERPL-MCNC: 0.8 MG/DL (ref 0.5–1.4)
CREAT SERPL-MCNC: 0.9 MG/DL (ref 0.5–1.4)
CREAT UR-MCNC: 22 MG/DL (ref 23–375)
CREATININE, URINE (MG/SPEC): 572 MG/SPEC
EST. GFR  (NO RACE VARIABLE): >60 ML/MIN/1.73 M^2
GLUCOSE SERPL-MCNC: 115 MG/DL (ref 70–110)
PHOSPHATE SERPL-MCNC: 2.7 MG/DL (ref 2.7–4.5)
POTASSIUM SERPL-SCNC: 4.1 MMOL/L (ref 3.5–5.1)
PTH-INTACT SERPL-MCNC: 118.4 PG/ML (ref 9–77)
SODIUM SERPL-SCNC: 140 MMOL/L (ref 136–145)
URINE COLLECTION DURATION: 24 HR
URINE COLLECTION DURATION: 24 HR
URINE VOLUME: 2600 ML
URINE VOLUME: 2600 ML

## 2023-10-16 PROCEDURE — 83970 ASSAY OF PARATHORMONE: CPT | Performed by: INTERNAL MEDICINE

## 2023-10-16 PROCEDURE — 82306 VITAMIN D 25 HYDROXY: CPT | Performed by: INTERNAL MEDICINE

## 2023-10-16 PROCEDURE — 82575 CREATININE CLEARANCE TEST: CPT | Performed by: INTERNAL MEDICINE

## 2023-10-16 PROCEDURE — 80069 RENAL FUNCTION PANEL: CPT | Performed by: INTERNAL MEDICINE

## 2023-10-16 PROCEDURE — 82340 ASSAY OF CALCIUM IN URINE: CPT | Performed by: INTERNAL MEDICINE

## 2023-10-16 PROCEDURE — 36415 COLL VENOUS BLD VENIPUNCTURE: CPT | Mod: PO | Performed by: INTERNAL MEDICINE

## 2023-10-18 ENCOUNTER — TELEPHONE (OUTPATIENT)
Dept: ENDOCRINOLOGY | Facility: CLINIC | Age: 38
End: 2023-10-18
Payer: COMMERCIAL

## 2023-10-18 ENCOUNTER — PATIENT MESSAGE (OUTPATIENT)
Dept: FAMILY MEDICINE | Facility: CLINIC | Age: 38
End: 2023-10-18
Payer: COMMERCIAL

## 2023-10-18 DIAGNOSIS — E21.3 HYPERPARATHYROIDISM: Primary | ICD-10-CM

## 2023-10-18 NOTE — PROGRESS NOTES
Labs improved with normal calcium, elevated PTH and low vitamin-D, normal urine calcium.      Recommend starting over-the-counter vitamin-D 4000 IU daily.      Please schedule labs in 6 months for PTH, renal function panel, vitamin-D.

## 2023-10-18 NOTE — TELEPHONE ENCOUNTER
LM for patient to inform him of his labs were all normal and the provider will like for him to get over -the- counter Vitamin D 4000 daily ,

## 2023-12-13 ENCOUNTER — PATIENT MESSAGE (OUTPATIENT)
Dept: FAMILY MEDICINE | Facility: CLINIC | Age: 38
End: 2023-12-13
Payer: COMMERCIAL

## 2024-01-02 ENCOUNTER — PATIENT MESSAGE (OUTPATIENT)
Dept: ENDOCRINOLOGY | Facility: CLINIC | Age: 39
End: 2024-01-02
Payer: COMMERCIAL

## 2024-01-02 ENCOUNTER — LAB VISIT (OUTPATIENT)
Dept: LAB | Facility: HOSPITAL | Age: 39
End: 2024-01-02
Payer: COMMERCIAL

## 2024-01-02 DIAGNOSIS — E21.3 HYPERPARATHYROIDISM: ICD-10-CM

## 2024-01-02 LAB — 25(OH)D3+25(OH)D2 SERPL-MCNC: 84 NG/ML (ref 30–96)

## 2024-01-02 PROCEDURE — 82306 VITAMIN D 25 HYDROXY: CPT | Performed by: INTERNAL MEDICINE

## 2024-01-02 PROCEDURE — 36415 COLL VENOUS BLD VENIPUNCTURE: CPT | Mod: PO | Performed by: INTERNAL MEDICINE

## 2024-01-21 ENCOUNTER — PATIENT MESSAGE (OUTPATIENT)
Dept: ADMINISTRATIVE | Facility: OTHER | Age: 39
End: 2024-01-21
Payer: COMMERCIAL

## 2024-01-21 ENCOUNTER — PATIENT MESSAGE (OUTPATIENT)
Dept: PRIMARY CARE CLINIC | Facility: CLINIC | Age: 39
End: 2024-01-21
Payer: COMMERCIAL

## 2024-03-27 ENCOUNTER — OFFICE VISIT (OUTPATIENT)
Dept: PSYCHIATRY | Facility: CLINIC | Age: 39
End: 2024-03-27
Payer: COMMERCIAL

## 2024-03-27 DIAGNOSIS — F41.8 OTHER SPECIFIED ANXIETY DISORDERS: ICD-10-CM

## 2024-03-27 DIAGNOSIS — F41.9 ANXIETY: Primary | ICD-10-CM

## 2024-03-27 DIAGNOSIS — F25.1 SCHIZOAFFECTIVE DISORDER, DEPRESSIVE TYPE: ICD-10-CM

## 2024-03-27 PROCEDURE — 1159F MED LIST DOCD IN RCRD: CPT | Mod: CPTII,95,, | Performed by: PHYSICIAN ASSISTANT

## 2024-03-27 PROCEDURE — 99214 OFFICE O/P EST MOD 30 MIN: CPT | Mod: 95,,, | Performed by: PHYSICIAN ASSISTANT

## 2024-03-27 PROCEDURE — G2211 COMPLEX E/M VISIT ADD ON: HCPCS | Mod: 95,,, | Performed by: PHYSICIAN ASSISTANT

## 2024-03-27 PROCEDURE — 1160F RVW MEDS BY RX/DR IN RCRD: CPT | Mod: CPTII,95,, | Performed by: PHYSICIAN ASSISTANT

## 2024-03-27 NOTE — PROGRESS NOTES
Outpatient Psychiatry Follow-Up Visit ([facility])    3/27/2024    Clinical Status of Patient:  Outpatient (Ambulatory)    Chief Complaint:  Luis Andujar is a 38 y.o. male who presents today for follow-up of mood disorder and anxiety.  Met with patient.        The patient location is: Home in [place] at address on record  The chief complaint leading to consultation is: F/u schizoaffective disorder and anxiety    Visit type: audiovisual    Face to Face time with patient: 8 min  14 minutes of total time spent on the encounter, which includes face to face time and non-face to face time preparing to see the patient (eg, review of tests), Obtaining and/or reviewing separately obtained history, Documenting clinical information in the electronic or other health record, Independently interpreting results (not separately reported) and communicating results to the patient/family/caregiver, or Care coordination (not separately reported).         Each patient to whom he or she provides medical services by telemedicine is:  (1) informed of the relationship between the physician and patient and the respective role of any other health care provider with respect to management of the patient; and (2) notified that he or she may decline to receive medical services by telemedicine and may withdraw from such care at any time.    Notes:       Interval History and Content of Current Session:  Interim Events/Subjective Report/Content of Current Session: Patient with schizoaffective disorder depressive type and anxiety is currently taking Abilify 20mg daily, Olanzapine 2.5 mg nightly, Zoloft 200 mg daily.  Still doing well, reports being busier doing chores since his mom fell and  her arm. States he feels busy but is getting everything done.  Reports his dad is happy with his efforts.     Review of Systems   PSYCHIATRIC: Pertinant items are noted in the narrative.  CONSTITUTIONAL: No weight gain or loss.   MUSCULOSKELETAL: No pain or  stiffness of the joints.  NEUROLOGIC: No weakness, sensory changes, seizures, confusion, memory loss, tremor or other abnormal movements.    Past Medical, Family and Social History: The patient's past medical, family and social history have been reviewed and updated as appropriate within the electronic medical record - see encounter notes.    Compliance: yes    Side effects: None    Risk Parameters:  Patient reports no suicidal ideation  Patient reports no homicidal ideation  Patient reports no self-injurious behavior  Patient reports no violent behavior    Exam (detailed: at least 9 elements; comprehensive: all 15 elements)   Constitutional  Vitals:  Most recent vital signs, dated less than 90 days prior to this appointment, were reviewed.   There were no vitals filed for this visit.     General:  unremarkable, age appropriate, casually dressed     Musculoskeletal  Muscle Strength/Tone:  not examined   Gait & Station:  unobserved     Psychiatric  Speech:  no latency; no press, monotone   Mood & Affect:  steady, euthymic  flat   Thought Process:  normal and logical   Associations:  intact   Thought Content:  normal, no suicidality, no homicidality, delusions, or paranoia   Insight:  intact, has awareness of illness   Judgement: behavior is adequate to circumstances.   Orientation:  grossly intact   Memory: intact for content of interview   Language: grossly intact   Attention Span & Concentration:  able to focus   Fund of Knowledge:  intact and appropriate to age and level of education     Assessment and Diagnosis   Status/Progress: Based on the examination today, the patient's problem(s) is/are well controlled.  New problems have not been presented today.   Lack of compliance are not complicating management of the primary condition.  There are no active rule-out diagnoses for this patient at this time.     General Impression: Schizoaffective disorder depressive type and anxiety disorder, depression well treated  with sertraline 200 mg daily, Abilify 20 mg daily, and olanzapine 2.5 mg nightly.  Would like to continue current regimen.  Again recommended pt speak to a PCP about his lipids.  He is on two different antipsychotics as he has done well with them in the past, and did not sleep well when we tried to stop olanzapine. He has been on several types dosages of antipsychotics, and feels the best with the lease side effects on this regimen.      ICD-10-CM ICD-9-CM   1. Anxiety  F41.9 300.00   2. Schizoaffective disorder, depressive type  F25.1 295.70   3. Other specified anxiety disorders  F41.8 300.09       1. Continue sertraline 200 mg daily, Abilify 20 mg daily, olanzapine 2.5 mg nightly.    2. Recommended pt schedule with a PCP and discuss lipids  3. F/U 4 months    Intervention/Counseling/Treatment Plan   Medication Management: The risks and benefits of medication were discussed with the patient.         Return to Clinic: as scheduled    Visit today included increased complexity associated with the care of the episodic problem stress addressed and managing the longitudinal care of the patient due to the serious and/or complex managed problem(s) schizoaffective disorder depressive type and anxiety.

## 2024-03-28 ENCOUNTER — LAB VISIT (OUTPATIENT)
Dept: LAB | Facility: HOSPITAL | Age: 39
End: 2024-03-28
Payer: COMMERCIAL

## 2024-03-28 DIAGNOSIS — E21.3 HYPERPARATHYROIDISM: ICD-10-CM

## 2024-03-28 LAB
ALBUMIN SERPL BCP-MCNC: 4.1 G/DL (ref 3.5–5.2)
ANION GAP SERPL CALC-SCNC: 7 MMOL/L (ref 8–16)
BUN SERPL-MCNC: 12 MG/DL (ref 6–20)
CALCIUM SERPL-MCNC: 10.6 MG/DL (ref 8.7–10.5)
CHLORIDE SERPL-SCNC: 109 MMOL/L (ref 95–110)
CO2 SERPL-SCNC: 24 MMOL/L (ref 23–29)
CREAT SERPL-MCNC: 0.9 MG/DL (ref 0.5–1.4)
EST. GFR  (NO RACE VARIABLE): >60 ML/MIN/1.73 M^2
GLUCOSE SERPL-MCNC: 106 MG/DL (ref 70–110)
PHOSPHATE SERPL-MCNC: 2.9 MG/DL (ref 2.7–4.5)
POTASSIUM SERPL-SCNC: 4.2 MMOL/L (ref 3.5–5.1)
PTH-INTACT SERPL-MCNC: 108.9 PG/ML (ref 9–77)
SODIUM SERPL-SCNC: 140 MMOL/L (ref 136–145)

## 2024-03-28 PROCEDURE — 83970 ASSAY OF PARATHORMONE: CPT | Performed by: INTERNAL MEDICINE

## 2024-03-28 PROCEDURE — 36415 COLL VENOUS BLD VENIPUNCTURE: CPT | Mod: PO | Performed by: INTERNAL MEDICINE

## 2024-03-28 PROCEDURE — 80069 RENAL FUNCTION PANEL: CPT | Performed by: INTERNAL MEDICINE

## 2024-03-29 DIAGNOSIS — E21.3 HYPERPARATHYROIDISM: Primary | ICD-10-CM

## 2024-03-29 NOTE — PROGRESS NOTES
Labs consistent with primary hyperparathyroidism so will proceed with baseline dexa (Lspine/hip and appendicular) and neck ultrasound (may need to proceed with CT neck if no adenoma visualized).      Please schedule dexa and neck ultrasound (department) followed by virtual visit.

## 2024-04-01 ENCOUNTER — TELEPHONE (OUTPATIENT)
Dept: ENDOCRINOLOGY | Facility: CLINIC | Age: 39
End: 2024-04-01
Payer: COMMERCIAL

## 2024-04-01 NOTE — TELEPHONE ENCOUNTER
----- Message from Abril Ga MD sent at 3/29/2024  1:17 PM CDT -----  Labs consistent with primary hyperparathyroidism so will proceed with baseline dexa (Lspine/hip and appendicular) and neck ultrasound (may need to proceed with CT neck if no adenoma visualized).      Please schedule dexa and neck ultrasound (department) followed by virtual visit.

## 2024-04-01 NOTE — TELEPHONE ENCOUNTER
Called patient to inform him to give me a cll back so we can schedule him a DXA and CT scan and to please give me a call back . I also left patient a portal message .

## 2024-04-04 ENCOUNTER — APPOINTMENT (OUTPATIENT)
Dept: RADIOLOGY | Facility: CLINIC | Age: 39
End: 2024-04-04
Attending: INTERNAL MEDICINE
Payer: COMMERCIAL

## 2024-04-04 DIAGNOSIS — E21.3 HYPERPARATHYROIDISM: ICD-10-CM

## 2024-04-04 PROCEDURE — 77081 DXA BONE DENSITY APPENDICULR: CPT | Mod: TC,PO

## 2024-04-04 PROCEDURE — 77081 DXA BONE DENSITY APPENDICULR: CPT | Mod: 26,59,, | Performed by: RADIOLOGY

## 2024-04-04 PROCEDURE — 77080 DXA BONE DENSITY AXIAL: CPT | Mod: TC,PO

## 2024-04-04 PROCEDURE — 77080 DXA BONE DENSITY AXIAL: CPT | Mod: 26,,, | Performed by: RADIOLOGY

## 2024-04-05 ENCOUNTER — PATIENT MESSAGE (OUTPATIENT)
Dept: ENDOCRINOLOGY | Facility: CLINIC | Age: 39
End: 2024-04-05
Payer: COMMERCIAL

## 2024-04-07 ENCOUNTER — PATIENT MESSAGE (OUTPATIENT)
Dept: ENDOCRINOLOGY | Facility: CLINIC | Age: 39
End: 2024-04-07
Payer: COMMERCIAL

## 2024-04-08 ENCOUNTER — TELEPHONE (OUTPATIENT)
Dept: ENDOCRINOLOGY | Facility: CLINIC | Age: 39
End: 2024-04-08
Payer: COMMERCIAL

## 2024-04-08 ENCOUNTER — PATIENT MESSAGE (OUTPATIENT)
Dept: ADMINISTRATIVE | Facility: OTHER | Age: 39
End: 2024-04-08
Payer: COMMERCIAL

## 2024-04-08 NOTE — TELEPHONE ENCOUNTER
----- Message from Abena Falk MA sent at 4/5/2024  4:58 PM CDT -----    ----- Message -----  From: Abril Ga MD  Sent: 4/5/2024   4:16 PM CDT  To: [facility] Staff    Please let patient know that he still needs complete the neck ultrasound which you can help schedule.  He needs a virtual visit with me or 1 of the fellow's after those results to discuss the next steps.  ----- Message -----  From: Interface, Rad Results In  Sent: 4/5/2024   2:24 PM CDT  To: Abril Ga MD

## 2024-04-09 ENCOUNTER — PATIENT MESSAGE (OUTPATIENT)
Dept: ENDOCRINOLOGY | Facility: CLINIC | Age: 39
End: 2024-04-09
Payer: COMMERCIAL

## 2024-04-10 ENCOUNTER — PATIENT MESSAGE (OUTPATIENT)
Dept: ENDOCRINOLOGY | Facility: CLINIC | Age: 39
End: 2024-04-10
Payer: COMMERCIAL

## 2024-04-17 ENCOUNTER — PATIENT MESSAGE (OUTPATIENT)
Dept: ENDOCRINOLOGY | Facility: CLINIC | Age: 39
End: 2024-04-17
Payer: COMMERCIAL

## 2024-04-17 ENCOUNTER — HOSPITAL ENCOUNTER (OUTPATIENT)
Dept: ENDOCRINOLOGY | Facility: CLINIC | Age: 39
Discharge: HOME | End: 2024-04-17
Attending: INTERNAL MEDICINE
Payer: COMMERCIAL

## 2024-04-17 DIAGNOSIS — E21.3 HYPERPARATHYROIDISM: ICD-10-CM

## 2024-04-17 PROCEDURE — 76536 US EXAM OF HEAD AND NECK: CPT | Mod: S$GLB,,, | Performed by: INTERNAL MEDICINE

## 2024-04-19 ENCOUNTER — PATIENT MESSAGE (OUTPATIENT)
Dept: ENDOCRINOLOGY | Facility: CLINIC | Age: 39
End: 2024-04-19
Payer: COMMERCIAL

## 2024-04-25 ENCOUNTER — OFFICE VISIT (OUTPATIENT)
Dept: ENDOCRINOLOGY | Facility: CLINIC | Age: 39
End: 2024-04-25
Payer: COMMERCIAL

## 2024-04-25 DIAGNOSIS — E21.0 PRIMARY HYPERPARATHYROIDISM: ICD-10-CM

## 2024-04-25 DIAGNOSIS — E21.3 HYPERPARATHYROIDISM: Primary | ICD-10-CM

## 2024-04-25 PROCEDURE — 99214 OFFICE O/P EST MOD 30 MIN: CPT | Mod: 95,,, | Performed by: INTERNAL MEDICINE

## 2024-04-25 PROCEDURE — G2211 COMPLEX E/M VISIT ADD ON: HCPCS | Mod: 95,,, | Performed by: INTERNAL MEDICINE

## 2024-04-25 NOTE — ASSESSMENT & PLAN NOTE
Workup consistent with primary hyperparathyroidism causing mild hypercalcemia and osteopenia in the distal radius.  Given young age recommend evaluation for surgery.  Neck ultrasound showed no parathyroid adenoma so will proceed with CT scan of the neck.  Will refer to Endocrine surgery to discuss surgical intervention.

## 2024-04-25 NOTE — PROGRESS NOTES
ENDOCRINOLOGY  04/25/2024    The patient location is: home    Visit type: audiovisual    Face to Face time with patient: 13  20 minutes of total time spent on the encounter, which includes face to face time and non-face to face time preparing to see the patient (eg, review of tests), Obtaining and/or reviewing separately obtained history, Documenting clinical information in the electronic or other health record, Independently interpreting results (not separately reported) and communicating results to the patient/family/caregiver, or Care coordination (not separately reported).     Each patient to whom he or she provides medical services by telemedicine is:  (1) informed of the relationship between the physician and patient and the respective role of any other health care provider with respect to management of the patient; and (2) notified that he or she may decline to receive medical services by telemedicine and may withdraw from such care at any time.    The patient's last visit with me was on 10/5/2023.     Reason for Visit:  referred by No ref. provider found for evaluation of hypercalcemia    HPI:  Luis Andujar is a 38 y.o. male with hx of anxiety/depression    Workup consistent with primary hyperparathyroidism causing hypercalcemia  For high normal vitD recommended decrasing to 2000 IU daily     Neck ultrasound without localiztion or parathyroid adenoma  Dexa with osteopenia in wrist   No hx of nephrolithiasis or elevated urine Ca    He is interested in parathyroid surgery    With regards to the hypercalcemia or primary hyperparathyroidism:    Was found to have hypercalcemia on routine labs - first noted: in 2017 on routine labs (intermittently normal)  Pth was checked:  elevared with high normal Ca, hx of vitD deficiency     Lab Results   Component Value Date    CALCIUM 10.6 (H) 03/28/2024    ALBUMIN 4.1 03/28/2024    ESTGFRAFRICA >60.0 08/05/2020    EGFRNONAA >60.0 08/05/2020    PHOS 2.9 03/28/2024     ALKPHOS 72 10/12/2022    YGZQSZVI45ZO 84 01/02/2024    .9 (H) 03/28/2024    TSH 2.426 08/05/2020   Urine Ca 107 with 2.6L collection    he denied current or past lithium use  Denies HCTZ use.    Thyroid ultrasound:  no adenoma     Daily intake of calcium is: no supplement  Taking vitamin D: none     + constipation for a few weeks  Stable depression on medication    Denies polyuria/polydipsia  Denies fractures, height loss or kidney stones    Dexa 4/4/24  The L1 to L4 vertebral bone mineral density is equal to 1.116 g/cm squared with a T score of 0.2.  The left forearm (radius 33%) bone mineral density is equal to  0.750 g/cm squared with at T-Score of -1.3.        Current Outpatient Medications:     ARIPiprazole (ABILIFY) 20 MG Tab, Take 1 tablet (20 mg total) by mouth once daily., Disp: 90 tablet, Rfl: 3    cetirizine (ZYRTEC) 10 mg Cap, as needed. , Disp: , Rfl:     fluticasone propionate (FLONASE) 50 mcg/actuation nasal spray, 1 spray (50 mcg total) by Each Nostril route 2 (two) times daily., Disp: 16 g, Rfl: 0    OLANZapine (ZYPREXA) 2.5 MG tablet, Take 1 tablet (2.5 mg total) by mouth every evening., Disp: 90 tablet, Rfl: 3    sertraline (ZOLOFT) 100 MG tablet, Take 2 tablets (200 mg total) by mouth once daily., Disp: 180 tablet, Rfl: 3    triamcinolone acetonide (NASAL ALLERGY NASL), , Disp: , Rfl:       ROS: see HPI     PHYSICAL EXAM  There were no vitals taken for this visit.  Wt Readings from Last 3 Encounters:   10/12/22 115.3 kg (254 lb 3.1 oz)   08/05/20 114.7 kg (252 lb 13.9 oz)   02/12/17 108.9 kg (240 lb)   ]    Constitutional:  Pleasant,  in no acute distress.   HENT:   Eyes:     No scleral icterus.   Respiratory:   Effort normal   Neurological:  normal speech  Psych:  Normal mood and affect.        IMAGING STUDIES    ASSESSMENT/PLAN    Problem List Items Addressed This Visit       Primary hyperparathyroidism     Workup consistent with primary hyperparathyroidism causing mild hypercalcemia and  osteopenia in the distal radius.  Given young age recommend evaluation for surgery.  Neck ultrasound showed no parathyroid adenoma so will proceed with CT scan of the neck.  Will refer to Endocrine surgery to discuss surgical intervention.           Other Visit Diagnoses       Hyperparathyroidism    -  Primary    Relevant Orders    CT Soft Tissue Neck W WO Contrast    Ambulatory referral/consult to Endocrine Surgery            Virtual in 6 month(s)  Needs CT neck soon      Abril Ga MD

## 2024-04-25 NOTE — Clinical Note
Patient referred for primary hyperparathyroidism.  I ordered a CT neck. Can you please schedule patient with Dr. Washburn after CT?

## 2024-04-26 ENCOUNTER — TELEPHONE (OUTPATIENT)
Dept: ENDOCRINOLOGY | Facility: CLINIC | Age: 39
End: 2024-04-26
Payer: COMMERCIAL

## 2024-04-26 DIAGNOSIS — E21.3 HYPERPARATHYROIDISM: Primary | ICD-10-CM

## 2024-04-26 NOTE — TELEPHONE ENCOUNTER
----- Message from Abena Falk MA sent at 4/26/2024  3:19 PM CDT -----  Regarding: FW: CT LABS    ----- Message -----  From: Lisa Guerra  Sent: 4/26/2024   3:11 PM CDT  To: S[facility] Samuels Staff  Subject: CT LABS                                          Hello,       This patient is scheduled for a ct scan. The patient will need a cmp, bmp, or creatinine lab prior to receiving contrast. Please place the lab order as soon as possible to avoid any delay in patient care.      Thank you,  Lisa Guerra

## 2024-04-29 ENCOUNTER — HOSPITAL ENCOUNTER (OUTPATIENT)
Dept: RADIOLOGY | Facility: HOSPITAL | Age: 39
Discharge: HOME | End: 2024-04-29
Attending: INTERNAL MEDICINE
Payer: COMMERCIAL

## 2024-04-29 DIAGNOSIS — E21.3 HYPERPARATHYROIDISM: ICD-10-CM

## 2024-04-29 PROCEDURE — 70492 CT SFT TSUE NCK W/O & W/DYE: CPT | Mod: TC

## 2024-04-29 PROCEDURE — 70492 CT SFT TSUE NCK W/O & W/DYE: CPT | Mod: 26,,, | Performed by: RADIOLOGY

## 2024-04-29 PROCEDURE — 25500020 PHARM REV CODE 255: Performed by: INTERNAL MEDICINE

## 2024-04-29 RX ADMIN — IOHEXOL 100 ML: 350 INJECTION, SOLUTION INTRAVENOUS at 09:04

## 2024-04-30 ENCOUNTER — PATIENT MESSAGE (OUTPATIENT)
Dept: ENDOCRINOLOGY | Facility: CLINIC | Age: 39
End: 2024-04-30
Payer: COMMERCIAL

## 2024-06-15 NOTE — PROGRESS NOTES
Endocrine Surgery History & Physical     REFERRING PROVIDER: Abril Ga MD    REASON FOR VISIT: Hyperparathyroidism    HPI: Luis Andujar is a 38 y.o. male patient with a history notable for anxiety and depression who presents in consultation for primary hyperparathyroidism.  Suspicion for hyperparathyroidism was raised on routine laboratory testing to have elevated calcium levels in 2017.    Details of the workup are as follows:     Recent laboratory studies:  Hypercalcemia noted since 2017  Max calcium elevation to 11.7 mg/dL  Parathyroid hormone levels elevted with hypercalcemia  Most recent PTH level was 109 with a corresponding calcium of 10.6, albumin normal 4.1  Phosphorus: 2.9  Vitamin D: 84  24 hour urine calcium: 107 mg/24 hours, Benign Familial Hypocalciuric Hypercalcemia unlikely    Medications:  Vitamin D supplementation: off vitamin D, endocrinology previously recommended decrease to 2000 IU D3 daily  Lithium, thiazide diuretics: none  Calcium supplements or calcimimetic: none    Symptoms:   The patient reports the following symptoms: [x]musculoskeletal pain (right arm, mild discomfort), []fractures, []nephrolithiasis, [x]GERD (mild), []peptic ulcer disease, []abdominal pain, [x]polydipsia, [x]polyuria, [x]pruritis  The patient reports the following neurocognitive symptoms: [x]brain fog, [x]concentration difficulties (relates this to psych meds), [x]fatigue, [x]forgetfulness, []mood/psychiatric disturbances  The patient reports intermittent dysphagia, denies globus sensation, compression symptoms, or anterior neck pain.  The patient denies hoarseness, voice changes or increased need to clear the throat.  Bone mineral density: []Osteoporosis [x]Osteopenia []Normal bone density.  Last DEXA 04/04/2024 with T-score -1.3 of distal forearm    Surgical risk factors:  Risk of concurrent thyroid disease: low, no thyroid nodules on ultrasound  Ultrasound of the thyroid 4/17/24   History of neck  radiation: none  Prior neck surgery: none  Cardiovascular risk: No echocardiogram on file  Antiplatelet therapy and anticoagulation: none    Family history:  Thyroid disease in mother and other relatives on his mother's side  No family history endocrine cancers including pituitary tumors, pancreatic neuroendocrine tumors, medullary thyroid cancer or pheochromocytoma/paraganglioma.     Localization studies done prior to this visit:  Ultrasound: (4/17/24): Normal thyroid. No parathyroid adenomas identified.  Nuclear Medicine Parathyroid Scan: not obtained   4D-CT Parathyroid scan: (4/29/24): No evidence to suggest parathyroid adenoma     LABORATORY STUDIES:  I personally and independently reviewed relevant lab test results, including the following:    Lab Results   Component Value Date    [id number] 84 01/02/2024    TSH 2.426 08/05/2020         PAST MEDICAL HISTORY:  Patient Active Problem List   Diagnosis    Serum calcium elevated    Primary hyperparathyroidism         PAST SURGICAL HISTORY:  No past surgical history on file.     MEDICATIONS:  Current Outpatient Medications   Medication Sig Dispense Refill    ARIPiprazole (ABILIFY) 20 MG Tab Take 1 tablet (20 mg total) by mouth once daily. 90 tablet 3    OLANZapine (ZYPREXA) 2.5 MG tablet Take 1 tablet (2.5 mg total) by mouth every evening. 90 tablet 3    sertraline (ZOLOFT) 100 MG tablet Take 2 tablets (200 mg total) by mouth once daily. 180 tablet 3     No current facility-administered medications for this visit.       ALLERGIES:  Review of patient's allergies indicates:   Allergen Reactions    Mold Hives    House dust        SOCIAL HISTORY:  Social History     Socioeconomic History    Marital status: Single   Tobacco Use    Smoking status: Never    Smokeless tobacco: Never   Substance and Sexual Activity    Alcohol use: Never    Drug use: No    Sexual activity: Never     Social Determinants of Health     Financial Resource Strain: High Risk (5/8/2023)    Overall  "Financial Resource Strain (CARDIA)     Difficulty of Paying Living Expenses: Hard   Food Insecurity: No Food Insecurity (5/8/2023)    Hunger Vital Sign     Worried About Running Out of Food in the Last Year: Never true     Ran Out of Food in the Last Year: Never true   Transportation Needs: No Transportation Needs (5/8/2023)    PRAPARE - Transportation     Lack of Transportation (Medical): No     Lack of Transportation (Non-Medical): No   Physical Activity: Unknown (11/22/2023)    Exercise Vital Sign     Days of Exercise per Week: Patient declined     Minutes of Exercise per Session: 30 min   Recent Concern: Physical Activity - Insufficiently Active (10/2/2023)    Exercise Vital Sign     Days of Exercise per Week: 2 days     Minutes of Exercise per Session: 30 min   Stress: Patient Declined (11/22/2023)    [place] [employer] - Occupational Stress Questionnaire     Feeling of Stress : Patient declined   Housing Stability: Unknown (11/22/2023)    Housing Stability Vital Sign     Unable to Pay for Housing in the Last Year: Patient refused     Unstable Housing in the Last Year: Patient refused         FAMILY HISTORY:  No family history on file.   See HPI    REVIEW OF SYSTEMS:  A detailed review of systems has been reviewed with the patient, pertinent positives and negatives are presented in the note and is otherwise negative.    PHYSICAL EXAMINATION:  Vital Signs: BP (!) 140/80   Ht 5' 8" (1.727 m)   Wt 115.7 kg (255 lb 1.2 oz)   BMI 38.78 kg/m²     Constitutional: well-developed, well-nourished, no acute distress   HENT: no lid lag, no exophthalmos, no scleral icterus, moist mucous membranes  Neck: supple, trachea in midline, thyroid is soft and moves well with swallowing, no palpable thyroid nodules, adequate neck extension  Heme/Lymph: no cervical or supraclavicular lymphadenopathy  Respiratory: normal respiratory effort, no wheezes or stridor  Cardiovascular: regular rate and " rhythm  Extremities: no edema  Skin: warm and dry  Neurologic: no gross resting tremor of outstretched hands, voice strong  Vascular: radial pulses palpable bilaterally  Psychiatric: affect normal    IMAGING STUDIES:  I personally and independently reviewed, visualized and interpreted the images of the below listed radiology studies (including the ultrasound and CT scan) and my findings are notable for a possible right superior parathyroid adenoma in the tracheoesophageal groove, though this is not called on the report and there appears to be a prominent left tubercle of zuckerkandl vs a second less well defined adenoma.  Reports below for reference.    CT Soft Tissue Neck W WO Contrast 04/29/2024  CLINICAL HISTORY:  Hyperparathyroidism, unspecifiedhyperparathyroidism; parathyroid protocol 4D CT please;    TECHNIQUE:  CT images obtained throughout the region of the neck before and after the administration of 100 mL Omnipaque 350 intravenous contrast.  Noncontrast and arterial phase images were obtained.  Venous and delayed phase images were mistakenly not performed.  Coronal and sagittal reformats were obtained.    COMPARISON:  Ultrasound from 04/17/2024    FINDINGS:  No abnormal soft tissue mass is identified within the neck. There is no evidence of pathologic lymph node enlargement.    The soft tissues of the nasopharynx, oropharynx, hypopharynx and larynx are within normal limits. The parotid, submandibular, and thyroid glands are within normal limits.    Major vessels of the neck appear patent. Limited intracranial evaluation is within normal limits.  The visualized paranasal sinuses and mastoid air cells are essentially clear.    Lung apices are clear.    Bones demonstrate no aggressive osseous lesion.    Visualize intracranial structures are normal in appearance.  Mucous retention cysts versus polyps in the maxillary sinuses bilaterally with patchy mucosal membrane thickening of the ethmoid sinuses and left  frontal sinus.    Impression  No evidence to suggest parathyroid adenoma.    Patchy sinus disease as described above.    Electronically signed by: Alexey Carolina MD  Date:    04/29/2024  Time:    13:36      US Soft Tissue Head Neck 04/17/2024  CLINICAL HISTORY:primary hyperparathyroidism;.  Hyperparathyroidism, unspecified  INDICATION:Follow-up surveillance for thyroid nodules.  TECHNIQUE:Real time ultrasound was performed in 2 planes using a Ernie Affiniti 50G ultrasound machine.  COMPARISON:No prior studies    FINDINGS:  THYROID GLAND has a homogenous echotexture.  Vascularity is normal.    RIGHT LOBE  Right lobe measures: 5.0 x 1.5 x 2.0 cm.  No distinct thyroid nodules are seen.    ISTHMUS  Isthmus measures: 0.4 cm in AP dimension.  No distinct thyroid nodules are seen.    LEFT LOBE  Left lobe measures: 5.1 x 1.5 x 1.8 cm.  No distinct thyroid nodules are seen.    LYMPH NODES:  Real-time survey of the bilateral central and lateral neck was performed.  It did not reveal any abnormal appearing lymph nodes.    Impression  1.  Thyroid gland is normal in size with homogenous echotexture.    2.  No parathyroid adenomas detected on this study.    RECOMMENDATIONS:  Consider 4D CT scan of the neck to further evaluate for parathyroid adenoma if parathyroidectomy is being considered.    Electronically signed by resident: Martin Peraza  Date:    04/17/2024  Time:    11:42    Electronically signed by: Kwadwo Georges MD, [facility]  Date:    04/17/2024  Time:    14:45    DXA Bone Density Appendicular Skeleton 04/04/2024  DXA Bone Density Axial Skeleton 1 or more sites 04/04/2024  CLINICAL HISTORY:Hyperparathyroidism, unspecified  TECHNIQUE:DXA scanning was performed over the lumbar spine and left forearm.  Review of the images confirms satisfactory positioning and technique.  COMPARISON:None    FINDINGS:  The L1 to L4 vertebral bone mineral density is equal to 1.116 g/cm squared with a T score of 0.2.    The left forearm  (radius 33%) bone mineral density is equal to  0.750 g/cm squared with at T-Score of -1.3.    Impression  Osteopenia  Electronically signed by: Laurie Jenkins MD  Date:    04/05/2024  Time:    14:21        IMPRESSION:  I had the pleasure of seeing Mr. Andujar who was accompanied by his father today in Endocrine Surgical consultation regarding the biochemical diagnosis of primary hyperparathyroidism.     We discussed that hyperparathyroidism can compromise bone and cardiovascular health. In addition to classic symptoms such as kidney stones and bone loss, hyperparathyroidism can be associated with multiple symptoms including fatigue, depression, memory loss, pruritis, polyuria, nocturia, constipation, polydipsia, and musculoskeletal aches and pains. Hyperparathyroidism can also worsen hypertension.  I discussed the implications of non-operative observation as well as the standard of care surgical treatment of primary hyperparathyroidism.  Based on the current clinical findings and a thorough discussion about the risks, benefits, and alternatives, the patient elected to proceed with parathyroidectomy.      We extensively discussed the pros and cons for surgical intervention, in this case parathyroidectomy with intraoperative parathyroid hormone monitoring.  We discussed that in the majority of cases, a single adenoma is the culprit gland. However, multigland disease is possible and multigland disease has a lower likelihood of radiographic localization.  Parathyroidectomy for single gland disease is a same day surgery while four-gland parathyroid exploration may require an overnight stay. We discussed that in all cases, parathyroidectomy has an attendant risk of postoperative hypocalcemia which can be mitigated with calcium and vitamin D supplementation. Other possible complications associated with this may include, but may not be restricted to hoarseness, recurrent laryngeal nerve injury - temporary or permanent,  superior laryngeal nerve injury - temporary or permanent, hypocalcemia and hypoparathyroidism - temporary or permanent, neck hematoma, bleeding, infection, scarring, wound healing problems and possible death. We discussed that in rare cases, parathyroid exploration may be negative. Recurrent and persistent disease are also possible.      All questions were answered and the patient expressed understanding of all the risks, benefits and alternatives, and agreed to proceed with the plan despite the risks.      Problem List Items Addressed This Visit       Serum calcium elevated     - Maintain adequate hydration  - See Primary  hyperparathyroidism          Primary hyperparathyroidism - Primary     Symptomatic primary hyperparathyroidism, meets criteria for parathyroid surgery based on the presence of symptoms, young age < 50, and degree of hypercalcemia.  Notably he has osteopenia with bone loss at the forearm.  Imaging studies are non-localizing thus far.    - OR for parathyroidectomy with intraoperative PTH monitoring  - RFP and PTH with preoperative CBC  - NM parathyroid scan for assisted localization.  I explained that positive localization is not needed nor does negative imaging preclude surgery for primary hyperparathyroidism  - Maintain adequate hydration and avoid the use of calcium supplements         Relevant Orders    Case Request Operating Room: PARATHYROIDECTOMY (Completed)    NM Parathyroid Scan with SPECT Routine     Other Visit Diagnoses       Hyperparathyroidism        Relevant Orders    CBC Auto Differential    Renal Function Panel    PTH, intact        Kimmie Washburn MD  Staff Surgeon  Endocrine Surgery  6/17/24

## 2024-06-17 ENCOUNTER — OFFICE VISIT (OUTPATIENT)
Dept: SURGERY | Facility: CLINIC | Age: 39
End: 2024-06-17
Payer: COMMERCIAL

## 2024-06-17 VITALS
HEIGHT: 68 IN | BODY MASS INDEX: 38.65 KG/M2 | WEIGHT: 255.06 LBS | SYSTOLIC BLOOD PRESSURE: 140 MMHG | DIASTOLIC BLOOD PRESSURE: 80 MMHG

## 2024-06-17 DIAGNOSIS — E21.0 PRIMARY HYPERPARATHYROIDISM: Primary | ICD-10-CM

## 2024-06-17 DIAGNOSIS — E83.52 SERUM CALCIUM ELEVATED: ICD-10-CM

## 2024-06-17 DIAGNOSIS — E21.3 HYPERPARATHYROIDISM: ICD-10-CM

## 2024-06-17 PROCEDURE — 3077F SYST BP >= 140 MM HG: CPT | Mod: CPTII,S$GLB,, | Performed by: STUDENT IN AN ORGANIZED HEALTH CARE EDUCATION/TRAINING PROGRAM

## 2024-06-17 PROCEDURE — 99999 PR PBB SHADOW E&M-EST. PATIENT-LVL IV: CPT | Mod: PBBFAC,,, | Performed by: STUDENT IN AN ORGANIZED HEALTH CARE EDUCATION/TRAINING PROGRAM

## 2024-06-17 PROCEDURE — 99205 OFFICE O/P NEW HI 60 MIN: CPT | Mod: S$GLB,,, | Performed by: STUDENT IN AN ORGANIZED HEALTH CARE EDUCATION/TRAINING PROGRAM

## 2024-06-17 PROCEDURE — 3079F DIAST BP 80-89 MM HG: CPT | Mod: CPTII,S$GLB,, | Performed by: STUDENT IN AN ORGANIZED HEALTH CARE EDUCATION/TRAINING PROGRAM

## 2024-06-17 PROCEDURE — 3008F BODY MASS INDEX DOCD: CPT | Mod: CPTII,S$GLB,, | Performed by: STUDENT IN AN ORGANIZED HEALTH CARE EDUCATION/TRAINING PROGRAM

## 2024-06-17 NOTE — ASSESSMENT & PLAN NOTE
Addended by: RYAN BECERRIL on: 4/13/2020 02:32 PM     Modules accepted: Orders     Symptomatic primary hyperparathyroidism, meets criteria for parathyroid surgery based on the presence of symptoms, young age < 50, and degree of hypercalcemia.  Notably he has osteopenia with bone loss at the forearm.  Imaging studies are non-localizing thus far.    - OR for parathyroidectomy with intraoperative PTH monitoring  - RFP and PTH with preoperative CBC  - NM parathyroid scan for assisted localization.  I explained that positive localization is not needed nor does negative imaging preclude surgery for primary hyperparathyroidism  - Maintain adequate hydration and avoid the use of calcium supplements

## 2024-06-25 ENCOUNTER — LAB VISIT (OUTPATIENT)
Dept: LAB | Facility: HOSPITAL | Age: 39
End: 2024-06-25
Attending: STUDENT IN AN ORGANIZED HEALTH CARE EDUCATION/TRAINING PROGRAM
Payer: COMMERCIAL

## 2024-06-25 DIAGNOSIS — E21.3 HYPERPARATHYROIDISM: ICD-10-CM

## 2024-06-25 LAB
ALBUMIN SERPL BCP-MCNC: 4 G/DL (ref 3.5–5.2)
ANION GAP SERPL CALC-SCNC: 9 MMOL/L (ref 8–16)
BASOPHILS # BLD AUTO: 0.05 K/UL (ref 0–0.2)
BASOPHILS NFR BLD: 0.6 % (ref 0–1.9)
BUN SERPL-MCNC: 15 MG/DL (ref 6–20)
CALCIUM SERPL-MCNC: 10 MG/DL (ref 8.7–10.5)
CHLORIDE SERPL-SCNC: 106 MMOL/L (ref 95–110)
CO2 SERPL-SCNC: 24 MMOL/L (ref 23–29)
CREAT SERPL-MCNC: 1 MG/DL (ref 0.5–1.4)
DIFFERENTIAL METHOD BLD: ABNORMAL
EOSINOPHIL # BLD AUTO: 0.4 K/UL (ref 0–0.5)
EOSINOPHIL NFR BLD: 4 % (ref 0–8)
ERYTHROCYTE [DISTWIDTH] IN BLOOD BY AUTOMATED COUNT: 12.8 % (ref 11.5–14.5)
EST. GFR  (NO RACE VARIABLE): >60 ML/MIN/1.73 M^2
GLUCOSE SERPL-MCNC: 109 MG/DL (ref 70–110)
HCT VFR BLD AUTO: 43.6 % (ref 40–54)
HGB BLD-MCNC: 14.1 G/DL (ref 14–18)
IMM GRANULOCYTES # BLD AUTO: 0.02 K/UL (ref 0–0.04)
IMM GRANULOCYTES NFR BLD AUTO: 0.2 % (ref 0–0.5)
LYMPHOCYTES # BLD AUTO: 2.3 K/UL (ref 1–4.8)
LYMPHOCYTES NFR BLD: 26.5 % (ref 18–48)
MCH RBC QN AUTO: 26.7 PG (ref 27–31)
MCHC RBC AUTO-ENTMCNC: 32.3 G/DL (ref 32–36)
MCV RBC AUTO: 82 FL (ref 82–98)
MONOCYTES # BLD AUTO: 0.5 K/UL (ref 0.3–1)
MONOCYTES NFR BLD: 5.9 % (ref 4–15)
NEUTROPHILS # BLD AUTO: 5.5 K/UL (ref 1.8–7.7)
NEUTROPHILS NFR BLD: 62.8 % (ref 38–73)
NRBC BLD-RTO: 0 /100 WBC
PHOSPHATE SERPL-MCNC: 2.6 MG/DL (ref 2.7–4.5)
PLATELET # BLD AUTO: 255 K/UL (ref 150–450)
PMV BLD AUTO: 11.1 FL (ref 9.2–12.9)
POTASSIUM SERPL-SCNC: 4 MMOL/L (ref 3.5–5.1)
PTH-INTACT SERPL-MCNC: 104.1 PG/ML (ref 9–77)
RBC # BLD AUTO: 5.29 M/UL (ref 4.6–6.2)
SODIUM SERPL-SCNC: 139 MMOL/L (ref 136–145)
WBC # BLD AUTO: 8.82 K/UL (ref 3.9–12.7)

## 2024-06-25 PROCEDURE — 36415 COLL VENOUS BLD VENIPUNCTURE: CPT | Performed by: STUDENT IN AN ORGANIZED HEALTH CARE EDUCATION/TRAINING PROGRAM

## 2024-06-25 PROCEDURE — 85025 COMPLETE CBC W/AUTO DIFF WBC: CPT | Performed by: STUDENT IN AN ORGANIZED HEALTH CARE EDUCATION/TRAINING PROGRAM

## 2024-06-25 PROCEDURE — 80069 RENAL FUNCTION PANEL: CPT | Performed by: STUDENT IN AN ORGANIZED HEALTH CARE EDUCATION/TRAINING PROGRAM

## 2024-06-25 PROCEDURE — 83970 ASSAY OF PARATHORMONE: CPT | Performed by: STUDENT IN AN ORGANIZED HEALTH CARE EDUCATION/TRAINING PROGRAM

## 2024-06-27 ENCOUNTER — PATIENT MESSAGE (OUTPATIENT)
Dept: SURGERY | Facility: CLINIC | Age: 39
End: 2024-06-27
Payer: COMMERCIAL

## 2024-07-08 ENCOUNTER — HOSPITAL ENCOUNTER (OUTPATIENT)
Dept: RADIOLOGY | Facility: HOSPITAL | Age: 39
Discharge: HOME | End: 2024-07-08
Attending: STUDENT IN AN ORGANIZED HEALTH CARE EDUCATION/TRAINING PROGRAM
Payer: COMMERCIAL

## 2024-07-08 DIAGNOSIS — E21.0 PRIMARY HYPERPARATHYROIDISM: ICD-10-CM

## 2024-07-08 PROCEDURE — A9500 TC99M SESTAMIBI: HCPCS | Performed by: STUDENT IN AN ORGANIZED HEALTH CARE EDUCATION/TRAINING PROGRAM

## 2024-07-08 PROCEDURE — 78072 PARATHYRD PLANAR W/SPECT&CT: CPT | Mod: 26,,, | Performed by: NUCLEAR MEDICINE

## 2024-07-08 PROCEDURE — 78072 PARATHYRD PLANAR W/SPECT&CT: CPT | Mod: TC

## 2024-07-08 RX ORDER — TETRAKIS(2-METHOXYISOBUTYLISOCYANIDE)COPPER(I) TETRAFLUOROBORATE 1 MG/ML
20 INJECTION, POWDER, LYOPHILIZED, FOR SOLUTION INTRAVENOUS
Status: COMPLETED | OUTPATIENT
Start: 2024-07-08 | End: 2024-07-08

## 2024-07-08 RX ADMIN — KIT FOR THE PREPARATION OF TECHNETIUM TC99M SESTAMIBI 20.9 MILLICURIE: 1 INJECTION, POWDER, LYOPHILIZED, FOR SOLUTION PARENTERAL at 07:07

## 2024-07-12 ENCOUNTER — PATIENT MESSAGE (OUTPATIENT)
Dept: SURGERY | Facility: CLINIC | Age: 39
End: 2024-07-12
Payer: COMMERCIAL

## 2024-07-20 DIAGNOSIS — F25.1 SCHIZOAFFECTIVE DISORDER, DEPRESSIVE TYPE: ICD-10-CM

## 2024-07-22 RX ORDER — OLANZAPINE 2.5 MG/1
2.5 TABLET ORAL NIGHTLY
Qty: 90 TABLET | Refills: 3 | Status: SHIPPED | OUTPATIENT
Start: 2024-07-22 | End: 2025-07-22

## 2024-07-25 NOTE — H&P (VIEW-ONLY)
Endocrine Surgery History & Physical     ENDOCRINOLOGIST: Dr. Ga    REASON FOR VISIT: Hyperparathyroidism    HPI: Luis Andujar is a 38 y.o. male patient with a history notable for anxiety and depression who presents in consultation for primary hyperparathyroidism.  Suspicion for hyperparathyroidism was raised on routine laboratory testing to have elevated calcium levels in 2017.    Interval history 7/29/2024:   Pt here for preoperative visit here for pre-operative visit for parathyroidectomy on 8/16. NM PSECT CT scan scan obtained since last visit showing increased uptake at the inferior right lobe of the thyroid.  The patient returns to the office today for a preoperative evaluation.  Patient denies any significant changes to the medical or surgical history.  Denies recent hospitalizations, emergency or urgent care visits, or illnesses.  He is having fatigue and tiredness.  We reviewed the expected perioperative course and all of the patient's questions were answered to their satisfaction.        Details of the workup are as follows:     Recent laboratory studies:  Hypercalcemia noted since 2017  Max calcium elevation to 11.7 mg/dL  Parathyroid hormone levels elevted with hypercalcemia  Most recent PTH level was 109 with a corresponding calcium of 10.6, albumin normal 4.1  Phosphorus: 2.9  Vitamin D: 84  24 hour urine calcium: 107 mg/24 hours, Benign Familial Hypocalciuric Hypercalcemia unlikely    Medications:  Vitamin D supplementation: off vitamin D, endocrinology previously recommended decrease to 2000 IU D3 daily  Lithium, thiazide diuretics: none  Calcium supplements or calcimimetic: none    Symptoms:   The patient reports the following symptoms: [x]musculoskeletal pain (right arm, mild discomfort), []fractures, []nephrolithiasis, [x]GERD (mild), []peptic ulcer disease, []abdominal pain, [x]polydipsia, [x]polyuria, [x]pruritis  The patient reports the following neurocognitive symptoms: [x]brain fog,  [x]concentration difficulties (relates this to psych meds), [x]fatigue, [x]forgetfulness, []mood/psychiatric disturbances  The patient reports intermittent dysphagia, denies globus sensation, compression symptoms, or anterior neck pain.  The patient denies hoarseness, voice changes or increased need to clear the throat.  Bone mineral density: []Osteoporosis [x]Osteopenia []Normal bone density.  Last DEXA 04/04/2024 with T-score -1.3 of distal forearm    Surgical risk factors:  Risk of concurrent thyroid disease: low, no thyroid nodules on ultrasound  Ultrasound of the thyroid 4/17/24   History of neck radiation: none  Prior neck surgery: none  Cardiovascular risk: No echocardiogram on file  Antiplatelet therapy and anticoagulation: none    Family history:  Thyroid disease in mother and other relatives on his mother's side  No family history endocrine cancers including pituitary tumors, pancreatic neuroendocrine tumors, medullary thyroid cancer or pheochromocytoma/paraganglioma.     Localization studies done prior to this visit:  Ultrasound: (4/17/24): Normal thyroid. No parathyroid adenomas identified.  Nuclear Medicine Parathyroid Scan 7/8/2024: Faint asymmetry of increased uptake inferior right lobe of the thyroid and on SPECT CT suspicious for parathyroid adenoma   4D-CT Parathyroid scan: (4/29/24): No evidence to suggest parathyroid adenoma     LABORATORY STUDIES:  I personally and independently reviewed relevant lab test results, including the following:    Lab Results   Component Value Date    OTELQCRX99OM 84 01/02/2024    TSH 2.426 08/05/2020         PAST MEDICAL HISTORY:  Patient Active Problem List   Diagnosis    Serum calcium elevated    Primary hyperparathyroidism         PAST SURGICAL HISTORY:  No past surgical history on file.   Hair transplantation in [place]    MEDICATIONS:  Current Outpatient Medications   Medication Sig Dispense Refill    ARIPiprazole (ABILIFY) 20 MG Tab Take 1 tablet (20 mg total) by  mouth once daily. 90 tablet 3    OLANZapine (ZYPREXA) 2.5 MG tablet Take 1 tablet (2.5 mg total) by mouth every evening. 90 tablet 3    sertraline (ZOLOFT) 100 MG tablet Take 2 tablets (200 mg total) by mouth once daily. 180 tablet 3     No current facility-administered medications for this visit.       ALLERGIES:  Review of patient's allergies indicates:   Allergen Reactions    Mold Hives       SOCIAL HISTORY:  Social History     Socioeconomic History    Marital status: Single   Tobacco Use    Smoking status: Never    Smokeless tobacco: Never   Substance and Sexual Activity    Alcohol use: Never    Drug use: No    Sexual activity: Never     Social Determinants of Health     Financial Resource Strain: High Risk (5/8/2023)    Overall Financial Resource Strain (CARDIA)     Difficulty of Paying Living Expenses: Hard   Food Insecurity: No Food Insecurity (5/8/2023)    Hunger Vital Sign     Worried About Running Out of Food in the Last Year: Never true     Ran Out of Food in the Last Year: Never true   Transportation Needs: No Transportation Needs (5/8/2023)    PRAPARE - Transportation     Lack of Transportation (Medical): No     Lack of Transportation (Non-Medical): No   Physical Activity: Unknown (11/22/2023)    Exercise Vital Sign     Days of Exercise per Week: Patient declined     Minutes of Exercise per Session: 30 min   Recent Concern: Physical Activity - Insufficiently Active (10/2/2023)    Exercise Vital Sign     Days of Exercise per Week: 2 days     Minutes of Exercise per Session: 30 min   Stress: Patient Declined (11/22/2023)    [employer] - Occupational Stress Questionnaire     Feeling of Stress : Patient declined   Housing Stability: Unknown (11/22/2023)    Housing Stability Vital Sign     Unable to Pay for Housing in the Last Year: Patient refused     Unstable Housing in the Last Year: Patient refused         FAMILY HISTORY:  No family history on file.   See HPI    REVIEW OF  "SYSTEMS:  A detailed review of systems has been reviewed with the patient, pertinent positives and negatives are presented in the note and is otherwise negative.    PHYSICAL EXAMINATION:  Vital Signs: /79   Ht 5' 8" (1.727 m)   Wt 114.1 kg (251 lb 8.7 oz)   BMI 38.25 kg/m²     Constitutional: well-developed, well-nourished, no acute distress   HENT: no lid lag, no exophthalmos, no scleral icterus, moist mucous membranes  Neck: supple, trachea in midline, thyroid is soft and moves well with swallowing, no palpable thyroid nodules, adequate neck extension  Heme/Lymph: no cervical or supraclavicular lymphadenopathy  Respiratory: normal respiratory effort, no wheezes or stridor  Cardiovascular: regular rate and rhythm  Extremities: no edema  Skin: warm and dry  Neurologic: no gross resting tremor of outstretched hands, voice strong  Vascular: radial pulses palpable bilaterally  Psychiatric: affect normal    IMAGING STUDIES:  I personally and independently reviewed, visualized and interpreted the images of the below listed radiology studies (including the ultrasound and CT scan previously and the NM scan at today's visit) and my findings are notable for a possible right superior parathyroid adenoma in the tracheoesophageal groove, though this is not called on the report and there appears to be a prominent left tubercle of zuckerkandl vs a second less well defined adenoma.  The NM scan shows persistent uptake on the right side, this corresponds to my CT finding likely showing an adenoma on the right side.   Reports below for reference.    CT Soft Tissue Neck W WO Contrast 04/29/2024  CLINICAL HISTORY:Hyperparathyroidism, unspecifiedhyperparathyroidism; parathyroid protocol 4D CT please;  TECHNIQUE:CT images obtained throughout the region of the neck before and after the administration of 100 mL Omnipaque 350 intravenous contrast.  Noncontrast and arterial phase images were obtained.  Venous and delayed phase " images were mistakenly not performed.  Coronal and sagittal reformats were obtained.  COMPARISON:Ultrasound from 04/17/2024    FINDINGS:  No abnormal soft tissue mass is identified within the neck. There is no evidence of pathologic lymph node enlargement.  The soft tissues of the nasopharynx, oropharynx, hypopharynx and larynx are within normal limits. The parotid, submandibular, and thyroid glands are within normal limits.  Major vessels of the neck appear patent. Limited intracranial evaluation is within normal limits.  The visualized paranasal sinuses and mastoid air cells are essentially clear.  Lung apices are clear.  Bones demonstrate no aggressive osseous lesion.  Visualize intracranial structures are normal in appearance.  Mucous retention cysts versus polyps in the maxillary sinuses bilaterally with patchy mucosal membrane thickening of the ethmoid sinuses and left frontal sinus.    Impression  No evidence to suggest parathyroid adenoma.    Patchy sinus disease as described above.    Electronically signed by: Alexey Carolina MD  Date:    04/29/2024  Time:    13:36      US Soft Tissue Head Neck 04/17/2024  CLINICAL HISTORY:primary hyperparathyroidism;.  Hyperparathyroidism, unspecified  INDICATION:Follow-up surveillance for thyroid nodules.  TECHNIQUE:Real time ultrasound was performed in 2 planes using a Ernie Affiniti 50G ultrasound machine.  COMPARISON:No prior studies    FINDINGS:  THYROID GLAND has a homogenous echotexture.  Vascularity is normal.    RIGHT LOBE  Right lobe measures: 5.0 x 1.5 x 2.0 cm.  No distinct thyroid nodules are seen.    ISTHMUS  Isthmus measures: 0.4 cm in AP dimension.  No distinct thyroid nodules are seen.    LEFT LOBE  Left lobe measures: 5.1 x 1.5 x 1.8 cm.  No distinct thyroid nodules are seen.    LYMPH NODES:  Real-time survey of the bilateral central and lateral neck was performed.  It did not reveal any abnormal appearing lymph nodes.    Impression  1.  Thyroid gland  is normal in size with homogenous echotexture.  2.  No parathyroid adenomas detected on this study.    RECOMMENDATIONS:  Consider 4D CT scan of the neck to further evaluate for parathyroid adenoma if parathyroidectomy is being considered.    Electronically signed by resident: Martin Peraza  Date:    04/17/2024  Time:    11:42    Electronically signed by: Kwadwo Georges MD, [facility]NU  Date:    04/17/2024  Time:    14:45    DXA Bone Density Appendicular Skeleton 04/04/2024  DXA Bone Density Axial Skeleton 1 or more sites 04/04/2024  CLINICAL HISTORY:Hyperparathyroidism, unspecified  TECHNIQUE:DXA scanning was performed over the lumbar spine and left forearm.  Review of the images confirms satisfactory positioning and technique.  COMPARISON:None    FINDINGS:  The L1 to L4 vertebral bone mineral density is equal to 1.116 g/cm squared with a T score of 0.2.    The left forearm (radius 33%) bone mineral density is equal to  0.750 g/cm squared with at T-Score of -1.3.    Impression  Osteopenia  Electronically signed by: Laurie Jenkins MD  Date:    04/05/2024  Time:    14:21          NM Parathyroid Scan with SPECT and CT 07/08/2024  CLINICAL HISTORY:Hyperparathyroidism;  Primary hyperparathyroidism 38-year-old male with history of hypercalcemia since the 2017 and osteopenia    Ca 10.6  03/28/24  TECHNIQUE:Following injection of 20.9 99mTc sestamibi and approximately 10 minute delay, anterior projection images of the neck and chest were obtained. After a two-hour delay, repeat anterior projection images of the neck were acquired. SPECT images of the neck and upper thorax were also acquired.  COMPARISON:Ultrasound 04/17/24 CT neck 04/'/'29/24    FINDINGS:  There is physiological tracer uptake in the myocardium, salivary glands, and thyroid gland.  Early images demonstrate diffuse uptake in both lobes of the thyroid with slight asymmetry of increased uptake inferior right lobe  Delayed images demonstrate partial tracer  washout from the thyroid with faint asymmetric activity in the inferior right lobe.  SPECT CT images demonstrate faint focal persisting uptake inferior right lobe of the thyroid    Impression  Faint asymmetry of increased uptake  inferior right lobe of the thyroid and on SPECT CT suspicious for parathyroid adenoma in  the presence of hypercalcemia and elevated PTH with normal ultrasound and CT neck.  Clinical correlation is advised    Electronically signed by: Maya Triana  Date:    07/08/2024  Time:    10:53    IMPRESSION:  I had the pleasure of seeing Mr. Andujar who was accompanied by his father today in Endocrine Surgical consultation regarding the biochemical diagnosis of primary hyperparathyroidism.     We discussed that hyperparathyroidism can compromise bone and cardiovascular health. In addition to classic symptoms such as kidney stones and bone loss, hyperparathyroidism can be associated with multiple symptoms including fatigue, depression, memory loss, pruritis, polyuria, nocturia, constipation, polydipsia, and musculoskeletal aches and pains. Hyperparathyroidism can also worsen hypertension.  I discussed the implications of non-operative observation as well as the standard of care surgical treatment of primary hyperparathyroidism.  Based on the current clinical findings and a thorough discussion about the risks, benefits, and alternatives, the patient elected to proceed with parathyroidectomy.      We extensively discussed the pros and cons for surgical intervention, in this case parathyroidectomy with intraoperative parathyroid hormone monitoring.  We discussed that in the majority of cases, a single adenoma is the culprit gland. However, multigland disease is possible and multigland disease has a lower likelihood of radiographic localization.  Parathyroidectomy for single gland disease is a same day surgery while four-gland parathyroid exploration may require an overnight stay. We discussed that in  all cases, parathyroidectomy has an attendant risk of postoperative hypocalcemia which can be mitigated with calcium and vitamin D supplementation. Other possible complications associated with this may include, but may not be restricted to hoarseness, recurrent laryngeal nerve injury - temporary or permanent, superior laryngeal nerve injury - temporary or permanent, hypocalcemia and hypoparathyroidism - temporary or permanent, neck hematoma, bleeding, infection, scarring, wound healing problems and possible death. We discussed that in rare cases, parathyroid exploration may be negative. Recurrent and persistent disease are also possible.      All questions were answered and the patient expressed understanding of all the risks, benefits and alternatives, and agreed to proceed with the plan despite the risks.      Problem List Items Addressed This Visit       Serum calcium elevated     - Maintain adequate hydration and avoid the use of calcium supplements         Primary hyperparathyroidism - Primary     Symptomatic primary hyperparathyroidism, meets criteria for parathyroid surgery based on the presence of symptoms, young age < 50, and degree of hypercalcemia.  Notably he has osteopenia with bone loss at the forearm.  Imaging studies with possible localization to right side on NM SPECT and correlates to a likely parathyroid adenoma on the CT scan.      - Scheduled for surgery, proceed to OR for parathyroidectomy with intraoperative PTH monitoring  - Preoperative education completed  - Surgical consent was signed and witnessed  - Reviewed need for postoperative calcium supplementation  - Recent labs reviewed             Kimmie Washburn MD  Staff Surgeon  Endocrine Surgery  7/29/24

## 2024-07-25 NOTE — PROGRESS NOTES
Endocrine Surgery History & Physical     ENDOCRINOLOGIST: Dr. Ga    REASON FOR VISIT: Hyperparathyroidism    HPI: Luis Andujar is a 38 y.o. male patient with a history notable for anxiety and depression who presents in consultation for primary hyperparathyroidism.  Suspicion for hyperparathyroidism was raised on routine laboratory testing to have elevated calcium levels in 2017.    Interval history 7/29/2024:   Pt here for preoperative visit here for pre-operative visit for parathyroidectomy on 8/16. NM PSECT CT scan scan obtained since last visit showing increased uptake at the inferior right lobe of the thyroid.  The patient returns to the office today for a preoperative evaluation.  Patient denies any significant changes to the medical or surgical history.  Denies recent hospitalizations, emergency or urgent care visits, or illnesses.  He is having fatigue and tiredness.  We reviewed the expected perioperative course and all of the patient's questions were answered to their satisfaction.        Details of the workup are as follows:     Recent laboratory studies:  Hypercalcemia noted since 2017  Max calcium elevation to 11.7 mg/dL  Parathyroid hormone levels elevted with hypercalcemia  Most recent PTH level was 109 with a corresponding calcium of 10.6, albumin normal 4.1  Phosphorus: 2.9  Vitamin D: 84  24 hour urine calcium: 107 mg/24 hours, Benign Familial Hypocalciuric Hypercalcemia unlikely    Medications:  Vitamin D supplementation: off vitamin D, endocrinology previously recommended decrease to 2000 IU D3 daily  Lithium, thiazide diuretics: none  Calcium supplements or calcimimetic: none    Symptoms:   The patient reports the following symptoms: [x]musculoskeletal pain (right arm, mild discomfort), []fractures, []nephrolithiasis, [x]GERD (mild), []peptic ulcer disease, []abdominal pain, [x]polydipsia, [x]polyuria, [x]pruritis  The patient reports the following neurocognitive symptoms: [x]brain fog,  [x]concentration difficulties (relates this to psych meds), [x]fatigue, [x]forgetfulness, []mood/psychiatric disturbances  The patient reports intermittent dysphagia, denies globus sensation, compression symptoms, or anterior neck pain.  The patient denies hoarseness, voice changes or increased need to clear the throat.  Bone mineral density: []Osteoporosis [x]Osteopenia []Normal bone density.  Last DEXA 04/04/2024 with T-score -1.3 of distal forearm    Surgical risk factors:  Risk of concurrent thyroid disease: low, no thyroid nodules on ultrasound  Ultrasound of the thyroid 4/17/24   History of neck radiation: none  Prior neck surgery: none  Cardiovascular risk: No echocardiogram on file  Antiplatelet therapy and anticoagulation: none    Family history:  Thyroid disease in mother and other relatives on his mother's side  No family history endocrine cancers including pituitary tumors, pancreatic neuroendocrine tumors, medullary thyroid cancer or pheochromocytoma/paraganglioma.     Localization studies done prior to this visit:  Ultrasound: (4/17/24): Normal thyroid. No parathyroid adenomas identified.  Nuclear Medicine Parathyroid Scan 7/8/2024: Faint asymmetry of increased uptake inferior right lobe of the thyroid and on SPECT CT suspicious for parathyroid adenoma   4D-CT Parathyroid scan: (4/29/24): No evidence to suggest parathyroid adenoma     LABORATORY STUDIES:  I personally and independently reviewed relevant lab test results, including the following:    Lab Results   Component Value Date    [id number] 84 01/02/2024    TSH 2.426 08/05/2020         PAST MEDICAL HISTORY:  Patient Active Problem List   Diagnosis    Serum calcium elevated    Primary hyperparathyroidism         PAST SURGICAL HISTORY:  No past surgical history on file.   Hair transplantation in [place]    MEDICATIONS:  Current Outpatient Medications   Medication Sig Dispense Refill    ARIPiprazole (ABILIFY) 20 MG Tab Take 1 tablet (20 mg total) by  mouth once daily. 90 tablet 3    OLANZapine (ZYPREXA) 2.5 MG tablet Take 1 tablet (2.5 mg total) by mouth every evening. 90 tablet 3    sertraline (ZOLOFT) 100 MG tablet Take 2 tablets (200 mg total) by mouth once daily. 180 tablet 3     No current facility-administered medications for this visit.       ALLERGIES:  Review of patient's allergies indicates:   Allergen Reactions    Mold Hives       SOCIAL HISTORY:  Social History     Socioeconomic History    Marital status: Single   Tobacco Use    Smoking status: Never    Smokeless tobacco: Never   Substance and Sexual Activity    Alcohol use: Never    Drug use: No    Sexual activity: Never     Social Determinants of Health     Financial Resource Strain: High Risk (5/8/2023)    Overall Financial Resource Strain (CARDIA)     Difficulty of Paying Living Expenses: Hard   Food Insecurity: No Food Insecurity (5/8/2023)    Hunger Vital Sign     Worried About Running Out of Food in the Last Year: Never true     Ran Out of Food in the Last Year: Never true   Transportation Needs: No Transportation Needs (5/8/2023)    PRAPARE - Transportation     Lack of Transportation (Medical): No     Lack of Transportation (Non-Medical): No   Physical Activity: Unknown (11/22/2023)    Exercise Vital Sign     Days of Exercise per Week: Patient declined     Minutes of Exercise per Session: 30 min   Recent Concern: Physical Activity - Insufficiently Active (10/2/2023)    Exercise Vital Sign     Days of Exercise per Week: 2 days     Minutes of Exercise per Session: 30 min   Stress: Patient Declined (11/22/2023)    [employer] - Occupational Stress Questionnaire     Feeling of Stress : Patient declined   Housing Stability: Unknown (11/22/2023)    Housing Stability Vital Sign     Unable to Pay for Housing in the Last Year: Patient refused     Unstable Housing in the Last Year: Patient refused         FAMILY HISTORY:  No family history on file.   See HPI    REVIEW OF  "SYSTEMS:  A detailed review of systems has been reviewed with the patient, pertinent positives and negatives are presented in the note and is otherwise negative.    PHYSICAL EXAMINATION:  Vital Signs: /79   Ht 5' 8" (1.727 m)   Wt 114.1 kg (251 lb 8.7 oz)   BMI 38.25 kg/m²     Constitutional: well-developed, well-nourished, no acute distress   HENT: no lid lag, no exophthalmos, no scleral icterus, moist mucous membranes  Neck: supple, trachea in midline, thyroid is soft and moves well with swallowing, no palpable thyroid nodules, adequate neck extension  Heme/Lymph: no cervical or supraclavicular lymphadenopathy  Respiratory: normal respiratory effort, no wheezes or stridor  Cardiovascular: regular rate and rhythm  Extremities: no edema  Skin: warm and dry  Neurologic: no gross resting tremor of outstretched hands, voice strong  Vascular: radial pulses palpable bilaterally  Psychiatric: affect normal    IMAGING STUDIES:  I personally and independently reviewed, visualized and interpreted the images of the below listed radiology studies (including the ultrasound and CT scan previously and the NM scan at today's visit) and my findings are notable for a possible right superior parathyroid adenoma in the tracheoesophageal groove, though this is not called on the report and there appears to be a prominent left tubercle of zuckerkandl vs a second less well defined adenoma.  The NM scan shows persistent uptake on the right side, this corresponds to my CT finding likely showing an adenoma on the right side.   Reports below for reference.    CT Soft Tissue Neck W WO Contrast 04/29/2024  CLINICAL HISTORY:Hyperparathyroidism, unspecifiedhyperparathyroidism; parathyroid protocol 4D CT please;  TECHNIQUE:CT images obtained throughout the region of the neck before and after the administration of 100 mL Omnipaque 350 intravenous contrast.  Noncontrast and arterial phase images were obtained.  Venous and delayed phase " images were mistakenly not performed.  Coronal and sagittal reformats were obtained.  COMPARISON:Ultrasound from 04/17/2024    FINDINGS:  No abnormal soft tissue mass is identified within the neck. There is no evidence of pathologic lymph node enlargement.  The soft tissues of the nasopharynx, oropharynx, hypopharynx and larynx are within normal limits. The parotid, submandibular, and thyroid glands are within normal limits.  Major vessels of the neck appear patent. Limited intracranial evaluation is within normal limits.  The visualized paranasal sinuses and mastoid air cells are essentially clear.  Lung apices are clear.  Bones demonstrate no aggressive osseous lesion.  Visualize intracranial structures are normal in appearance.  Mucous retention cysts versus polyps in the maxillary sinuses bilaterally with patchy mucosal membrane thickening of the ethmoid sinuses and left frontal sinus.    Impression  No evidence to suggest parathyroid adenoma.    Patchy sinus disease as described above.    Electronically signed by: Alexey Carolina MD  Date:    04/29/2024  Time:    13:36      US Soft Tissue Head Neck 04/17/2024  CLINICAL HISTORY:primary hyperparathyroidism;.  Hyperparathyroidism, unspecified  INDICATION:Follow-up surveillance for thyroid nodules.  TECHNIQUE:Real time ultrasound was performed in 2 planes using a Ernie Affiniti 50G ultrasound machine.  COMPARISON:No prior studies    FINDINGS:  THYROID GLAND has a homogenous echotexture.  Vascularity is normal.    RIGHT LOBE  Right lobe measures: 5.0 x 1.5 x 2.0 cm.  No distinct thyroid nodules are seen.    ISTHMUS  Isthmus measures: 0.4 cm in AP dimension.  No distinct thyroid nodules are seen.    LEFT LOBE  Left lobe measures: 5.1 x 1.5 x 1.8 cm.  No distinct thyroid nodules are seen.    LYMPH NODES:  Real-time survey of the bilateral central and lateral neck was performed.  It did not reveal any abnormal appearing lymph nodes.    Impression  1.  Thyroid gland  is normal in size with homogenous echotexture.  2.  No parathyroid adenomas detected on this study.    RECOMMENDATIONS:  Consider 4D CT scan of the neck to further evaluate for parathyroid adenoma if parathyroidectomy is being considered.    Electronically signed by resident: Martin Peraza  Date:    04/17/2024  Time:    11:42    Electronically signed by: Kwadwo Georges MD, ECNU  Date:    04/17/2024  Time:    14:45    DXA Bone Density Appendicular Skeleton 04/04/2024  DXA Bone Density Axial Skeleton 1 or more sites 04/04/2024  CLINICAL HISTORY:Hyperparathyroidism, unspecified  TECHNIQUE:DXA scanning was performed over the lumbar spine and left forearm.  Review of the images confirms satisfactory positioning and technique.  COMPARISON:None    FINDINGS:  The L1 to L4 vertebral bone mineral density is equal to 1.116 g/cm squared with a T score of 0.2.    The left forearm (radius 33%) bone mineral density is equal to  0.750 g/cm squared with at T-Score of -1.3.    Impression  Osteopenia  Electronically signed by: Laurie Jenkins MD  Date:    04/05/2024  Time:    14:21          NM Parathyroid Scan with SPECT and CT 07/08/2024  CLINICAL HISTORY:Hyperparathyroidism;  Primary hyperparathyroidism 38-year-old male with history of hypercalcemia since the 2017 and osteopenia    Ca 10.6  03/28/24  TECHNIQUE:Following injection of 20.9 99mTc sestamibi and approximately 10 minute delay, anterior projection images of the neck and chest were obtained. After a two-hour delay, repeat anterior projection images of the neck were acquired. SPECT images of the neck and upper thorax were also acquired.  COMPARISON:Ultrasound 04/17/24 CT neck 04/'/'29/24    FINDINGS:  There is physiological tracer uptake in the myocardium, salivary glands, and thyroid gland.  Early images demonstrate diffuse uptake in both lobes of the thyroid with slight asymmetry of increased uptake inferior right lobe  Delayed images demonstrate partial tracer  washout from the thyroid with faint asymmetric activity in the inferior right lobe.  SPECT CT images demonstrate faint focal persisting uptake inferior right lobe of the thyroid    Impression  Faint asymmetry of increased uptake  inferior right lobe of the thyroid and on SPECT CT suspicious for parathyroid adenoma in  the presence of hypercalcemia and elevated PTH with normal ultrasound and CT neck.  Clinical correlation is advised    Electronically signed by: Maya Triana  Date:    07/08/2024  Time:    10:53    IMPRESSION:  I had the pleasure of seeing Mr. Andujar who was accompanied by his father today in Endocrine Surgical consultation regarding the biochemical diagnosis of primary hyperparathyroidism.     We discussed that hyperparathyroidism can compromise bone and cardiovascular health. In addition to classic symptoms such as kidney stones and bone loss, hyperparathyroidism can be associated with multiple symptoms including fatigue, depression, memory loss, pruritis, polyuria, nocturia, constipation, polydipsia, and musculoskeletal aches and pains. Hyperparathyroidism can also worsen hypertension.  I discussed the implications of non-operative observation as well as the standard of care surgical treatment of primary hyperparathyroidism.  Based on the current clinical findings and a thorough discussion about the risks, benefits, and alternatives, the patient elected to proceed with parathyroidectomy.      We extensively discussed the pros and cons for surgical intervention, in this case parathyroidectomy with intraoperative parathyroid hormone monitoring.  We discussed that in the majority of cases, a single adenoma is the culprit gland. However, multigland disease is possible and multigland disease has a lower likelihood of radiographic localization.  Parathyroidectomy for single gland disease is a same day surgery while four-gland parathyroid exploration may require an overnight stay. We discussed that in  all cases, parathyroidectomy has an attendant risk of postoperative hypocalcemia which can be mitigated with calcium and vitamin D supplementation. Other possible complications associated with this may include, but may not be restricted to hoarseness, recurrent laryngeal nerve injury - temporary or permanent, superior laryngeal nerve injury - temporary or permanent, hypocalcemia and hypoparathyroidism - temporary or permanent, neck hematoma, bleeding, infection, scarring, wound healing problems and possible death. We discussed that in rare cases, parathyroid exploration may be negative. Recurrent and persistent disease are also possible.      All questions were answered and the patient expressed understanding of all the risks, benefits and alternatives, and agreed to proceed with the plan despite the risks.      Problem List Items Addressed This Visit       Serum calcium elevated     - Maintain adequate hydration and avoid the use of calcium supplements         Primary hyperparathyroidism - Primary     Symptomatic primary hyperparathyroidism, meets criteria for parathyroid surgery based on the presence of symptoms, young age < 50, and degree of hypercalcemia.  Notably he has osteopenia with bone loss at the forearm.  Imaging studies with possible localization to right side on NM SPECT and correlates to a likely parathyroid adenoma on the CT scan.      - Scheduled for surgery, proceed to OR for parathyroidectomy with intraoperative PTH monitoring  - Preoperative education completed  - Surgical consent was signed and witnessed  - Reviewed need for postoperative calcium supplementation  - Recent labs reviewed             Kimmie Washburn MD  Staff Surgeon  Endocrine Surgery  7/29/24

## 2024-07-29 ENCOUNTER — OFFICE VISIT (OUTPATIENT)
Dept: SURGERY | Facility: CLINIC | Age: 39
End: 2024-07-29
Payer: COMMERCIAL

## 2024-07-29 VITALS
SYSTOLIC BLOOD PRESSURE: 130 MMHG | WEIGHT: 251.56 LBS | DIASTOLIC BLOOD PRESSURE: 79 MMHG | HEIGHT: 68 IN | BODY MASS INDEX: 38.13 KG/M2

## 2024-07-29 DIAGNOSIS — E83.52 SERUM CALCIUM ELEVATED: ICD-10-CM

## 2024-07-29 DIAGNOSIS — E21.0 PRIMARY HYPERPARATHYROIDISM: Primary | ICD-10-CM

## 2024-07-29 PROCEDURE — 3008F BODY MASS INDEX DOCD: CPT | Mod: CPTII,S$GLB,, | Performed by: STUDENT IN AN ORGANIZED HEALTH CARE EDUCATION/TRAINING PROGRAM

## 2024-07-29 PROCEDURE — 1159F MED LIST DOCD IN RCRD: CPT | Mod: CPTII,S$GLB,, | Performed by: STUDENT IN AN ORGANIZED HEALTH CARE EDUCATION/TRAINING PROGRAM

## 2024-07-29 PROCEDURE — 99999 PR PBB SHADOW E&M-EST. PATIENT-LVL III: CPT | Mod: PBBFAC,,, | Performed by: STUDENT IN AN ORGANIZED HEALTH CARE EDUCATION/TRAINING PROGRAM

## 2024-07-29 PROCEDURE — 3078F DIAST BP <80 MM HG: CPT | Mod: CPTII,S$GLB,, | Performed by: STUDENT IN AN ORGANIZED HEALTH CARE EDUCATION/TRAINING PROGRAM

## 2024-07-29 PROCEDURE — 99213 OFFICE O/P EST LOW 20 MIN: CPT | Mod: S$GLB,,, | Performed by: STUDENT IN AN ORGANIZED HEALTH CARE EDUCATION/TRAINING PROGRAM

## 2024-07-29 PROCEDURE — 3075F SYST BP GE 130 - 139MM HG: CPT | Mod: CPTII,S$GLB,, | Performed by: STUDENT IN AN ORGANIZED HEALTH CARE EDUCATION/TRAINING PROGRAM

## 2024-07-29 NOTE — ASSESSMENT & PLAN NOTE
Symptomatic primary hyperparathyroidism, meets criteria for parathyroid surgery based on the presence of symptoms, young age < 50, and degree of hypercalcemia.  Notably he has osteopenia with bone loss at the forearm.  Imaging studies with possible localization to right side on NM SPECT and correlates to a likely parathyroid adenoma on the CT scan.      - Scheduled for surgery, proceed to OR for parathyroidectomy with intraoperative PTH monitoring  - Preoperative education completed  - Surgical consent was signed and witnessed  - Reviewed need for postoperative calcium supplementation  - Recent labs reviewed

## 2024-08-15 ENCOUNTER — PATIENT MESSAGE (OUTPATIENT)
Dept: SURGERY | Facility: CLINIC | Age: 39
End: 2024-08-15
Payer: COMMERCIAL

## 2024-08-15 ENCOUNTER — ANESTHESIA EVENT (OUTPATIENT)
Dept: SURGERY | Facility: HOSPITAL | Age: 39
End: 2024-08-15
Payer: COMMERCIAL

## 2024-08-15 ENCOUNTER — TELEPHONE (OUTPATIENT)
Dept: SURGERY | Facility: CLINIC | Age: 39
End: 2024-08-15
Payer: COMMERCIAL

## 2024-08-15 DIAGNOSIS — Z98.890 S/P PARATHYROIDECTOMY: Primary | ICD-10-CM

## 2024-08-15 DIAGNOSIS — Z90.89 S/P PARATHYROIDECTOMY: Primary | ICD-10-CM

## 2024-08-15 NOTE — ANESTHESIA PREPROCEDURE EVALUATION
[facility]  Anesthesia Pre-Operative Evaluation         Patient Name: Luis Andujar  YOB: 1985  MRN: [id number]    SUBJECTIVE:     Pre-operative evaluation for Procedure(s) (LRB):  PARATHYROIDECTOMY (N/A)     08/15/2024    Luis Andujar is a 38 y.o. male w/ a significant PMHx of obesity (BMI 38), schizoaffective disorder depressive type (on olanzapine and ariprazole), anxiety, and primary hyperparathyroidism .    Patient now presents for the above procedure(s).      Prev airway: None documented.      Patient Active Problem List   Diagnosis    Serum calcium elevated    Primary hyperparathyroidism       Review of patient's allergies indicates:   Allergen Reactions    Mold Hives       Current Inpatient Medications:      No current facility-administered medications on file prior to encounter.     Current Outpatient Medications on File Prior to Encounter   Medication Sig Dispense Refill    sertraline (ZOLOFT) 100 MG tablet Take 2 tablets (200 mg total) by mouth once daily. 180 tablet 3    ARIPiprazole (ABILIFY) 20 MG Tab Take 1 tablet (20 mg total) by mouth once daily. 90 tablet 3       No past surgical history on file.    Social History     Socioeconomic History    Marital status: Single   Tobacco Use    Smoking status: Never    Smokeless tobacco: Never   Substance and Sexual Activity    Alcohol use: Never    Drug use: No    Sexual activity: Never     Social Determinants of Health     Financial Resource Strain: High Risk (5/8/2023)    Overall Financial Resource Strain (CARDIA)     Difficulty of Paying Living Expenses: Hard   Food Insecurity: No Food Insecurity (5/8/2023)    Hunger Vital Sign     Worried About Running Out of Food in the Last Year: Never true     Ran Out of Food in the Last Year: Never true   Transportation Needs: No Transportation Needs (5/8/2023)    PRAPARE - Transportation     Lack of Transportation (Medical): No     Lack of Transportation (Non-Medical): No    Physical Activity: Unknown (11/22/2023)    Exercise Vital Sign     Days of Exercise per Week: Patient declined     Minutes of Exercise per Session: 30 min   Recent Concern: Physical Activity - Insufficiently Active (10/2/2023)    Exercise Vital Sign     Days of Exercise per Week: 2 days     Minutes of Exercise per Session: 30 min   Stress: Patient Declined (11/22/2023)    [employer] - Occupational Stress Questionnaire     Feeling of Stress : Patient declined   Housing Stability: Unknown (11/22/2023)    Housing Stability Vital Sign     Unable to Pay for Housing in the Last Year: Patient refused     Unstable Housing in the Last Year: Patient refused       OBJECTIVE:     Vital Signs Range (Last 24H):         Significant Labs:  Lab Results   Component Value Date    WBC 8.82 06/25/2024    HGB 14.1 06/25/2024    HCT 43.6 06/25/2024     06/25/2024    CHOL 240 (H) 09/26/2023    TRIG 180 (H) 09/26/2023    HDL 43 09/26/2023    ALT 24 10/12/2022    AST 27 10/12/2022     06/25/2024    K 4.0 06/25/2024     06/25/2024    CREATININE 1.0 06/25/2024    BUN 15 06/25/2024    CO2 24 06/25/2024    TSH 2.426 08/05/2020    HGBA1C 5.4 10/12/2022       Diagnostic Studies: No relevant studies.    EKG:   No results found for this or any previous visit.    2D ECHO:  TTE:  No results found for this or any previous visit.    SHIVANI:  No results found for this or any previous visit.    ASSESSMENT/PLAN:           Pre-op Assessment    I have reviewed the Patient Summary Reports.     I have reviewed the Nursing Notes. I have reviewed the NPO Status.   I have reviewed the Medications.     Review of Systems  Anesthesia Hx:  No problems with previous Anesthesia             Denies Family Hx of Anesthesia complications.    Denies Personal Hx of Anesthesia complications.                    Social:  Non-Smoker, No Alcohol Use       Hematology/Oncology:       -- Denies Anemia:                  Denies  Current/Recent Cancer                Cardiovascular:      Denies Hypertension.    Denies CAD.     Denies Dysrhythmias.    Denies CHF.    no hyperlipidemia                             Pulmonary:    Denies COPD.  Denies Asthma.     Denies Sleep Apnea.                Renal/:   Denies Chronic Renal Disease.                Hepatic/GI:      Denies GERD. Denies Liver Disease.            Musculoskeletal:  Denies Arthritis.               Neurological:    Denies CVA. Denies Neuromuscular Disease.   Denies Seizures.          Denies Chronic Pain Syndrome                         Endocrine:  Denies Diabetes.   Denies Hyperthyroidism. Primary hyperparathyroidism      Obesity / BMI > 30  Psych:  Psychiatric History anxiety depression                Physical Exam  General: Well nourished, Cooperative and Alert    Airway:  Mallampati: III / II  Mouth Opening: Normal  TM Distance: Normal        Anesthesia Plan  Type of Anesthesia, risks & benefits discussed:    Anesthesia Type: Gen ETT  Intra-op Monitoring Plan: Standard ASA Monitors and Art Line  Post Op Pain Control Plan: multimodal analgesia and IV/PO Opioids PRN  Induction:  IV  Airway Plan: Direct and Video, Post-Induction  Informed Consent: Informed consent signed with the Patient and all parties understand the risks and agree with anesthesia plan.  All questions answered.   ASA Score: 3  Day of Surgery Review of History & Physical: H&P Update referred to the surgeon/provider.    Ready For Surgery From Anesthesia Perspective.     .

## 2024-08-16 ENCOUNTER — HOSPITAL ENCOUNTER (OUTPATIENT)
Facility: HOSPITAL | Age: 39
Discharge: HOME | End: 2024-08-16
Attending: STUDENT IN AN ORGANIZED HEALTH CARE EDUCATION/TRAINING PROGRAM | Admitting: STUDENT IN AN ORGANIZED HEALTH CARE EDUCATION/TRAINING PROGRAM
Payer: COMMERCIAL

## 2024-08-16 ENCOUNTER — ANESTHESIA (OUTPATIENT)
Dept: SURGERY | Facility: HOSPITAL | Age: 39
End: 2024-08-16
Payer: COMMERCIAL

## 2024-08-16 VITALS
DIASTOLIC BLOOD PRESSURE: 79 MMHG | OXYGEN SATURATION: 94 % | SYSTOLIC BLOOD PRESSURE: 127 MMHG | HEIGHT: 68 IN | HEART RATE: 68 BPM | WEIGHT: 248 LBS | BODY MASS INDEX: 37.59 KG/M2 | TEMPERATURE: 98 F | RESPIRATION RATE: 16 BRPM

## 2024-08-16 DIAGNOSIS — E21.0 PRIMARY HYPERPARATHYROIDISM: ICD-10-CM

## 2024-08-16 LAB
PTH-INTACT SERPL-MCNC: 146.4 PG/ML (ref 9–77)
PTH-INTACT SERPL-MCNC: 39.6 PG/ML (ref 9–77)
PTH-INTACT SERPL-MCNC: 40.9 PG/ML (ref 9–77)
PTH-INTACT SERPL-MCNC: 46.9 PG/ML (ref 9–77)
PTH-INTACT SERPL-MCNC: 49.4 PG/ML (ref 9–77)
PTH-INTACT SERPL-MCNC: 54.9 PG/ML (ref 9–77)

## 2024-08-16 PROCEDURE — 71000044 HC DOSC ROUTINE RECOVERY FIRST HOUR: Performed by: STUDENT IN AN ORGANIZED HEALTH CARE EDUCATION/TRAINING PROGRAM

## 2024-08-16 PROCEDURE — 25000242 PHARM REV CODE 250 ALT 637 W/ HCPCS: Performed by: NURSE ANESTHETIST, CERTIFIED REGISTERED

## 2024-08-16 PROCEDURE — 88331 PATH CONSLTJ SURG 1 BLK 1SPC: CPT | Performed by: PATHOLOGY

## 2024-08-16 PROCEDURE — 63600175 PHARM REV CODE 636 W HCPCS

## 2024-08-16 PROCEDURE — 25000003 PHARM REV CODE 250

## 2024-08-16 PROCEDURE — 37000009 HC ANESTHESIA EA ADD 15 MINS: Performed by: STUDENT IN AN ORGANIZED HEALTH CARE EDUCATION/TRAINING PROGRAM

## 2024-08-16 PROCEDURE — 83970 ASSAY OF PARATHORMONE: CPT | Mod: 91 | Performed by: STUDENT IN AN ORGANIZED HEALTH CARE EDUCATION/TRAINING PROGRAM

## 2024-08-16 PROCEDURE — 63600175 PHARM REV CODE 636 W HCPCS: Performed by: NURSE ANESTHETIST, CERTIFIED REGISTERED

## 2024-08-16 PROCEDURE — 88305 TISSUE EXAM BY PATHOLOGIST: CPT | Performed by: PATHOLOGY

## 2024-08-16 PROCEDURE — 37000008 HC ANESTHESIA 1ST 15 MINUTES: Performed by: STUDENT IN AN ORGANIZED HEALTH CARE EDUCATION/TRAINING PROGRAM

## 2024-08-16 PROCEDURE — 27201037 HC PRESSURE MONITORING SET UP

## 2024-08-16 PROCEDURE — 71000015 HC POSTOP RECOV 1ST HR: Performed by: STUDENT IN AN ORGANIZED HEALTH CARE EDUCATION/TRAINING PROGRAM

## 2024-08-16 PROCEDURE — 63600175 PHARM REV CODE 636 W HCPCS: Mod: JZ,JG | Performed by: STUDENT IN AN ORGANIZED HEALTH CARE EDUCATION/TRAINING PROGRAM

## 2024-08-16 PROCEDURE — 36000707: Performed by: STUDENT IN AN ORGANIZED HEALTH CARE EDUCATION/TRAINING PROGRAM

## 2024-08-16 PROCEDURE — 27201423 OPTIME MED/SURG SUP & DEVICES STERILE SUPPLY: Performed by: STUDENT IN AN ORGANIZED HEALTH CARE EDUCATION/TRAINING PROGRAM

## 2024-08-16 PROCEDURE — 36000706: Performed by: STUDENT IN AN ORGANIZED HEALTH CARE EDUCATION/TRAINING PROGRAM

## 2024-08-16 PROCEDURE — 71000033 HC RECOVERY, INTIAL HOUR: Performed by: STUDENT IN AN ORGANIZED HEALTH CARE EDUCATION/TRAINING PROGRAM

## 2024-08-16 PROCEDURE — 60500 EXPLORE PARATHYROID GLANDS: CPT | Mod: ,,, | Performed by: STUDENT IN AN ORGANIZED HEALTH CARE EDUCATION/TRAINING PROGRAM

## 2024-08-16 RX ORDER — HYDROMORPHONE HYDROCHLORIDE 1 MG/ML
0.2 INJECTION, SOLUTION INTRAMUSCULAR; INTRAVENOUS; SUBCUTANEOUS EVERY 5 MIN PRN
Status: DISCONTINUED | OUTPATIENT
Start: 2024-08-16 | End: 2024-08-16 | Stop reason: HOSPADM

## 2024-08-16 RX ORDER — SODIUM CHLORIDE 0.9 % (FLUSH) 0.9 %
10 SYRINGE (ML) INJECTION
Status: DISCONTINUED | OUTPATIENT
Start: 2024-08-16 | End: 2024-08-16 | Stop reason: HOSPADM

## 2024-08-16 RX ORDER — CALCIUM CARBONATE 400(1000)
800 TABLET,CHEWABLE ORAL 2 TIMES DAILY WITH MEALS
COMMUNITY
Start: 2024-08-16 | End: 2025-08-16

## 2024-08-16 RX ORDER — MIDAZOLAM HYDROCHLORIDE 1 MG/ML
INJECTION INTRAMUSCULAR; INTRAVENOUS
Status: DISCONTINUED | OUTPATIENT
Start: 2024-08-16 | End: 2024-08-16

## 2024-08-16 RX ORDER — TRAMADOL HYDROCHLORIDE 50 MG/1
50 TABLET ORAL EVERY 6 HOURS
Qty: 8 TABLET | Refills: 0 | Status: SHIPPED | OUTPATIENT
Start: 2024-08-16

## 2024-08-16 RX ORDER — LIDOCAINE HYDROCHLORIDE 20 MG/ML
INJECTION, SOLUTION EPIDURAL; INFILTRATION; INTRACAUDAL; PERINEURAL
Status: DISCONTINUED | OUTPATIENT
Start: 2024-08-16 | End: 2024-08-16

## 2024-08-16 RX ORDER — SUCCINYLCHOLINE CHLORIDE 20 MG/ML
INJECTION INTRAMUSCULAR; INTRAVENOUS
Status: DISCONTINUED | OUTPATIENT
Start: 2024-08-16 | End: 2024-08-16

## 2024-08-16 RX ORDER — METOCLOPRAMIDE HYDROCHLORIDE 5 MG/ML
10 INJECTION INTRAMUSCULAR; INTRAVENOUS EVERY 10 MIN PRN
Status: DISCONTINUED | OUTPATIENT
Start: 2024-08-16 | End: 2024-08-16 | Stop reason: HOSPADM

## 2024-08-16 RX ORDER — GLUCAGON 1 MG
1 KIT INJECTION
Status: DISCONTINUED | OUTPATIENT
Start: 2024-08-16 | End: 2024-08-16 | Stop reason: HOSPADM

## 2024-08-16 RX ORDER — DEXMEDETOMIDINE HYDROCHLORIDE 100 UG/ML
INJECTION, SOLUTION INTRAVENOUS
Status: DISCONTINUED | OUTPATIENT
Start: 2024-08-16 | End: 2024-08-16

## 2024-08-16 RX ORDER — HALOPERIDOL 5 MG/ML
INJECTION INTRAMUSCULAR
Status: DISCONTINUED | OUTPATIENT
Start: 2024-08-16 | End: 2024-08-16

## 2024-08-16 RX ORDER — DEXAMETHASONE SODIUM PHOSPHATE 4 MG/ML
INJECTION, SOLUTION INTRA-ARTICULAR; INTRALESIONAL; INTRAMUSCULAR; INTRAVENOUS; SOFT TISSUE
Status: DISCONTINUED | OUTPATIENT
Start: 2024-08-16 | End: 2024-08-16

## 2024-08-16 RX ORDER — BUPIVACAINE HYDROCHLORIDE 2.5 MG/ML
INJECTION, SOLUTION EPIDURAL; INFILTRATION; INTRACAUDAL
Status: DISCONTINUED | OUTPATIENT
Start: 2024-08-16 | End: 2024-08-16 | Stop reason: HOSPADM

## 2024-08-16 RX ORDER — ONDANSETRON HYDROCHLORIDE 2 MG/ML
INJECTION, SOLUTION INTRAVENOUS
Status: DISCONTINUED | OUTPATIENT
Start: 2024-08-16 | End: 2024-08-16

## 2024-08-16 RX ORDER — TRAMADOL HYDROCHLORIDE 50 MG/1
50 TABLET ORAL ONCE AS NEEDED
Status: DISCONTINUED | OUTPATIENT
Start: 2024-08-16 | End: 2024-08-16 | Stop reason: HOSPADM

## 2024-08-16 RX ORDER — ALBUTEROL SULFATE 90 UG/1
INHALANT RESPIRATORY (INHALATION)
Status: DISCONTINUED | OUTPATIENT
Start: 2024-08-16 | End: 2024-08-16

## 2024-08-16 RX ORDER — SODIUM CHLORIDE 9 MG/ML
INJECTION, SOLUTION INTRAVENOUS CONTINUOUS
Status: DISCONTINUED | OUTPATIENT
Start: 2024-08-16 | End: 2024-08-16 | Stop reason: HOSPADM

## 2024-08-16 RX ORDER — KETAMINE HCL IN 0.9 % NACL 50 MG/5 ML
SYRINGE (ML) INTRAVENOUS
Status: DISCONTINUED | OUTPATIENT
Start: 2024-08-16 | End: 2024-08-16

## 2024-08-16 RX ORDER — ROCURONIUM BROMIDE 10 MG/ML
INJECTION, SOLUTION INTRAVENOUS
Status: DISCONTINUED | OUTPATIENT
Start: 2024-08-16 | End: 2024-08-16

## 2024-08-16 RX ORDER — ACETAMINOPHEN 500 MG
1000 TABLET ORAL
Status: COMPLETED | OUTPATIENT
Start: 2024-08-16 | End: 2024-08-16

## 2024-08-16 RX ORDER — FENTANYL CITRATE 50 UG/ML
INJECTION, SOLUTION INTRAMUSCULAR; INTRAVENOUS
Status: DISCONTINUED | OUTPATIENT
Start: 2024-08-16 | End: 2024-08-16

## 2024-08-16 RX ORDER — PHENYLEPHRINE HYDROCHLORIDE 10 MG/ML
INJECTION INTRAVENOUS
Status: DISCONTINUED | OUTPATIENT
Start: 2024-08-16 | End: 2024-08-16

## 2024-08-16 RX ORDER — PROPOFOL 10 MG/ML
VIAL (ML) INTRAVENOUS
Status: DISCONTINUED | OUTPATIENT
Start: 2024-08-16 | End: 2024-08-16

## 2024-08-16 RX ADMIN — Medication 20 MG: at 11:08

## 2024-08-16 RX ADMIN — SODIUM CHLORIDE, SODIUM GLUCONATE, SODIUM ACETATE, POTASSIUM CHLORIDE, MAGNESIUM CHLORIDE, SODIUM PHOSPHATE, DIBASIC, AND POTASSIUM PHOSPHATE: .53; .5; .37; .037; .03; .012; .00082 INJECTION, SOLUTION INTRAVENOUS at 12:08

## 2024-08-16 RX ADMIN — SUCCINYLCHOLINE CHLORIDE 180 MG: 20 INJECTION, SOLUTION INTRAMUSCULAR; INTRAVENOUS; PARENTERAL at 11:08

## 2024-08-16 RX ADMIN — FENTANYL CITRATE 50 MCG: 50 INJECTION, SOLUTION INTRAMUSCULAR; INTRAVENOUS at 02:08

## 2024-08-16 RX ADMIN — SODIUM CHLORIDE 0.2 MCG/KG/MIN: 9 INJECTION, SOLUTION INTRAVENOUS at 12:08

## 2024-08-16 RX ADMIN — ALBUTEROL SULFATE 5 PUFF: 108 INHALANT RESPIRATORY (INHALATION) at 03:08

## 2024-08-16 RX ADMIN — SODIUM CHLORIDE: 0.9 INJECTION, SOLUTION INTRAVENOUS at 11:08

## 2024-08-16 RX ADMIN — DEXMEDETOMIDINE 8 MCG: 200 INJECTION, SOLUTION INTRAVENOUS at 02:08

## 2024-08-16 RX ADMIN — Medication 10 MG: at 12:08

## 2024-08-16 RX ADMIN — GLYCOPYRROLATE 0.2 MG: 0.2 INJECTION INTRAMUSCULAR; INTRAVENOUS at 02:08

## 2024-08-16 RX ADMIN — LIDOCAINE HYDROCHLORIDE 100 MG: 20 INJECTION, SOLUTION EPIDURAL; INFILTRATION; INTRACAUDAL at 11:08

## 2024-08-16 RX ADMIN — HALOPERIDOL LACTATE 1 MG: 5 INJECTION, SOLUTION INTRAMUSCULAR at 02:08

## 2024-08-16 RX ADMIN — ONDANSETRON 4 MG: 2 INJECTION INTRAMUSCULAR; INTRAVENOUS at 02:08

## 2024-08-16 RX ADMIN — PHENYLEPHRINE HYDROCHLORIDE 100 MCG: 10 INJECTION INTRAVENOUS at 02:08

## 2024-08-16 RX ADMIN — FENTANYL CITRATE 100 MCG: 50 INJECTION, SOLUTION INTRAMUSCULAR; INTRAVENOUS at 11:08

## 2024-08-16 RX ADMIN — DEXMEDETOMIDINE 12 MCG: 200 INJECTION, SOLUTION INTRAVENOUS at 03:08

## 2024-08-16 RX ADMIN — PROPOFOL 200 MG: 10 INJECTION, EMULSION INTRAVENOUS at 11:08

## 2024-08-16 RX ADMIN — MIDAZOLAM 2 MG: 1 INJECTION INTRAMUSCULAR; INTRAVENOUS at 11:08

## 2024-08-16 RX ADMIN — ROCURONIUM BROMIDE 5 MG: 10 INJECTION INTRAVENOUS at 11:08

## 2024-08-16 RX ADMIN — DEXAMETHASONE SODIUM PHOSPHATE 8 MG: 4 INJECTION INTRA-ARTICULAR; INTRALESIONAL; INTRAMUSCULAR; INTRAVENOUS; SOFT TISSUE at 12:08

## 2024-08-16 RX ADMIN — GLYCOPYRROLATE 0.2 MG: 0.2 INJECTION INTRAMUSCULAR; INTRAVENOUS at 01:08

## 2024-08-16 RX ADMIN — Medication 10 MG: at 02:08

## 2024-08-16 RX ADMIN — GLYCOPYRROLATE 0.2 MG: 0.2 INJECTION INTRAMUSCULAR; INTRAVENOUS at 12:08

## 2024-08-16 RX ADMIN — ACETAMINOPHEN 1000 MG: 500 TABLET ORAL at 11:08

## 2024-08-16 RX ADMIN — PHENYLEPHRINE HYDROCHLORIDE 0.5 MCG/KG/MIN: 10 INJECTION INTRAVENOUS at 12:08

## 2024-08-16 NOTE — ANESTHESIA PROCEDURE NOTES
Arterial    Diagnosis: Hyperparathyroidism    Patient location during procedure: done in OR  Timeout: 8/16/2024 12:00 PM  Procedure end time: 8/16/2024 12:03 PM    Staffing  Authorizing Provider: Wil Navarro III, MD  Performing Provider: Gonzales Aguayo MD    Staffing  Performed by: Gonzales Aguayo MD  Authorized by: Wil Navarro III, MD    Anesthesiologist was present at the time of the procedure.    Preanesthetic Checklist  Completed: patient identified, IV checked, site marked, risks and benefits discussed, surgical consent, monitors and equipment checked, pre-op evaluation, timeout performed and anesthesia consent givenArterial  Skin Prep: chlorhexidine gluconate  Local Infiltration: none  Orientation: left  Location: radial    Catheter Size: 20 G  Catheter placement by Ultrasound guidance. Heme positive aspiration all ports.   Vessel Caliber: patent  Needle advanced into vessel with real time Ultrasound guidance.Insertion Attempts: 1  Assessment  Dressing: secured with tape and tegaderm  Patient: Tolerated well

## 2024-08-16 NOTE — BRIEF OP NOTE
[facility] - Surgery ([facility])  Brief Operative Note    Surgery Date: 8/16/2024     Surgeons and Role:     * Kimmie Washburn MD - Primary     * Sheng Bray MD - Resident - Assisting        Pre-op Diagnosis:  Primary hyperparathyroidism [E21.0]    Post-op Diagnosis:  Post-Op Diagnosis Codes:     * Primary hyperparathyroidism [E21.0]    Procedure(s) (LRB):  PARATHYROIDECTOMY (Right)    Anesthesia: General    Operative Findings: Right inferior parathyroid adenoma excision with appropriate drop in PTH.     Estimated Blood Loss: 5 ml         Specimens:   Specimen (24h ago, onward)       Start     Ordered    08/16/24 1456  Specimen to Pathology, Surgery Thyroid, Parathyroid, and Adrenals  Once        Comments: Pre-op Diagnosis: Primary hyperparathyroidism [E21.0]Procedure(s):PARATHYROIDECTOMY Number of specimens: 2Name of specimens: 1. Right inferior parathyroid biopsy, Frozen2. Right inferior parathyroid adenoma, Permanent     References:    Click here for ordering Quick Tip   Question Answer Comment   Procedure Type: Thyroid, Parathyroid, and Adrenals    Specimen Class: Routine/Screening    Release to patient Immediate        08/16/24 1456                      Discharge Note    OUTCOME: Patient tolerated treatment/procedure well without complication and is now ready for discharge.    DISPOSITION: Home or Self Care    FINAL DIAGNOSIS:  Hyperparathyroidism     FOLLOWUP: In clinic      DISCHARGE INSTRUCTIONS:    Discharge Procedure Orders   Diet Adult Regular     Notify your health care provider if you experience any of the following:  temperature >100.4     Notify your health care provider if you experience any of the following:  persistent nausea and vomiting or diarrhea     Notify your health care provider if you experience any of the following:  redness, tenderness, or signs of infection (pain, swelling, redness, odor or green/yellow discharge around incision site)     Notify your health care provider if you  experience any of the following:  difficulty breathing or increased cough

## 2024-08-16 NOTE — ANESTHESIA POSTPROCEDURE EVALUATION
Anesthesia Post Evaluation    Patient: Luis Andujar    Procedure(s) Performed: Procedure(s) (LRB):  PARATHYROIDECTOMY (Right)    Final Anesthesia Type: general      Patient location during evaluation: PACU  Patient participation: Yes- Able to Participate  Level of consciousness: awake and alert  Post-procedure vital signs: reviewed and stable  Pain management: adequate  Airway patency: patent    PONV status at discharge: No PONV  Anesthetic complications: no      Cardiovascular status: blood pressure returned to baseline  Respiratory status: unassisted  Hydration status: euvolemic  Follow-up not needed.              Vitals Value Taken Time   /74 08/16/24 1616   Temp 36.7 °C (98.1 °F) 08/16/24 1510   Pulse 72 08/16/24 1622   Resp 10 08/16/24 1622   SpO2 93 % 08/16/24 1622   Vitals shown include unfiled device data.      Event Time   Out of Recovery 16:00:00         Pain/William Score: Pain Rating Prior to Med Admin: 0 (8/16/2024 11:28 AM)  William Score: 9 (8/16/2024  4:00 PM)

## 2024-08-16 NOTE — PROGRESS NOTES
Nursing Transfer Note      Reason patient is being transferred: post procedure    Transfer To: [facility] 26    Transfer via stretcher    Transfer with ice pack    Transported by RN    Medicines sent: none (patient's mother in possession of home meds delivered by bedside pharmacy)    Any special needs or follow-up needed: must remain in [facility] until 1740; patient due to void prior to discharge    Chart send with patient: Yes    Patient reassessed at: 8/16/2024 1600    Report given at bedside to [facility] RN

## 2024-08-16 NOTE — TRANSFER OF CARE
"Anesthesia Transfer of Care Note    Patient: Luis Andujar    Procedure(s) Performed: Procedure(s) (LRB):  PARATHYROIDECTOMY (Right)    Patient location: PACU    Anesthesia Type: general    Transport from OR: Transported from OR on 6-10 L/min O2 by face mask with adequate spontaneous ventilation    Post pain: adequate analgesia    Post assessment: no apparent anesthetic complications and tolerated procedure well    Post vital signs: stable    Level of consciousness: awake    Nausea/Vomiting: no nausea/vomiting    Complications: none    Transfer of care protocol was followed    Last vitals: Visit Vitals  BP (!) 104/55   Pulse 80   Temp 36.7 °C (98.1 °F) (Temporal)   Resp 20   Ht 5' 8" (1.727 m)   Wt 112.5 kg (248 lb)   SpO2 100%   BMI 37.71 kg/m²     "

## 2024-08-16 NOTE — OP NOTE
[facility]  Endocrine Surgery  Operative Report         Date of Procedure: 8/16/2024     Procedure: Procedure(s) (LRB):  PARATHYROIDECTOMY (Right)     Indications: This patient presents with primary hyperparathyroidism.  Preoperative imaging localizes to a possible right sided parathyroid adenoma.  The patient now presents for a parathyroidectomy with intraoperative PTH monitoring.     Surgeons and Role:     * Kimmie Washburn MD - Primary     * Sheng Bray MD - Resident - Assisting (PGY4)    Pre-Operative Diagnosis: Primary hyperparathyroidism [E21.0]    Post-Operative Diagnosis: Primary hyperparathyroidism [E21.0]    Anesthesia: General    Procedures:   Parathyroidectomy, unilateral right sided exploration, excision of right inferior parathyroid adenoma  Intraoperative monitoring and interpretation of right cranial nerves (vagus and recurrent laryngeal nerves) using the [employer] system  Intraoperative PTH level sampling and interpretation     Intraoperative Findings:   Right inferior parathyroid adenoma was identified.  Confirmed parathyroid tissue with frozen section, reported as hypercellular parathyroid tissue.  Gland excised.  Right superior parathyroid gland identified.  Gland remains in situ and marked with a small clip.  Appropriate decrease in PTH following excision of right superior parathyroid adenoma.  Baseline PTH: 146.4, 10 minutes post-excision: 46.4.  The right recurrent laryngeal nerve and vagus nerve were identified and preserved.  Function was verified using the nerve monitoring system.    Description of the Procedure:  The patient was seen in the Holding Room. The risks, benefits, complications, treatment options, and expected outcomes were discussed with the patient. The possibilities of reaction to medication, pulmonary aspiration, bleeding, infection, finding a normal parathyroid tissue, inability to identify all explored parathyroid glands, recurrent or persistent  hyperparathyroidism, temporary or permanent hypocalcemia, temporary or permanent hypoparathyroidism, superior laryngeal or recurrent laryngeal nerve damage, the need for additional procedures, failure to diagnose a condition, creating a complication requiring transfusion or operation, stroke, death and imponderables. The patient concurred with the proposed plan and agreed to proceed despite the risks, giving informed consent.  The site of surgery was confirmed and marked. The patient was taken to Operating Room, identified as Luis Pratima and the procedure verified as parathyroidectomy with intraoperative PTH monitoring.     Induction of general anesthesia was performed, the patient was intubated with an electromyography endotracheal tube, and the anesthesia team placed all appropriate lines.  A baseline PTH level was drawn and resulted as 146.4.  The patient was positioned by the operating room, anesthesia, and surgical staff in a modified beachchair position with a shoulder roll, the neck supported in an extended position, the arms padded and tucked.  An intraoperative ultrasound was performed prior to incision and identified the anatomic landmarks of the thyroid gland.  The surgical field was prepped and draped in standard sterile fashion.  A timeout was performed.  A 5 cm transverse cervical incision was created below the cricoid cartilage within a natural skin fold. Dissection was carried down through the platysma layer. Once this was completed, sub-platysmal flaps were raised superiorly to the thyroid cartilage and inferiorly to the sternal notch. The strap muscles were identified and divided at the midline. Sharp and blunt dissection were used to mobilize the right thyroid lobe in a medial direction.  A vagus signal was confirmed with the nerve monitoring system.      The right inferior parathyroid gland was identified extending deep into the tracheoesophageal groove medial to the recurrent laryngeal nerve  with the nerve coursing over the adenoma.  It appeared cystic, dark, and enlarged.  This was carefully dissected free circumferentially.  The polar vessels were identified and ligated with tiny clips early in the dissection of the adenoma.  It was thought initially to be an over-descended superior parathyroid gland based on the location of the adenoma in relation to the nerve.      A time zero PTH level was drawn at the time of right inferior parathyroid specimen extraction, with subsequent levels at 5, 10, 15 and 20 minutes post-excision.  These resulted at time 0 = 40.9, 5 min - 39.6, 10 min = 46.9, 15 min = 49.4, and 20 min = 54.9.  Parathyroid tissue was confirmed with frozen section and reported as hypercellular parathyroid tissue.    Dissection continued on the ipsilateral side and the right superior parathyroid gland was identified along the upper aspect of the tubercle of zuckerkandl.  The right superior parathyroid appeared to be normal in size and color and was preserved in situ and marked with a small clip.  Vagus nerve signal was confirmed with the nerve monitoring system.    The surgical bed was irrigated and inspected carefully. Multiple Valsalva maneuvers were performed at 30-40 cm of water and additional hemostasis was achieved as necessary with focal application of bipolar cautery. This was augmented with Fibrillar which was placed in the surgical bed.  Recurrent laryngeal and vagus nerve function was verified on the right using the nerve monitor prior to closure.  0.25% Marcaine was injected into the subcutaneous tissue of the incision for post-op analgesia. The strap muscles were then closed with interrupted 3-0 Vicryl suture.  The platysma was closed with interrupted 3-0 Vicryl suture, and the skin incision was closed with a 6-0 Vicryl subcuticular knot-less closure. Sterile skin glue was applied to the incision.    A debrief was performed.  Specimens were confirmed.  Instrument, sponge, and  needle counts were reported correct prior to closure and at the conclusion of the case.  The family was updated at the completion of the case.    Complications: No    Estimated Blood Loss (EBL): less than 50 mL           Drains: None    Implants: None    Specimens:   Specimen (24h ago, onward)       Start     Ordered    08/16/24 1456  Specimen to Pathology, Surgery Thyroid, Parathyroid, and Adrenals  Once        Comments: Pre-op Diagnosis: Primary hyperparathyroidism [E21.0]Procedure(s):PARATHYROIDECTOMY Number of specimens: 2Name of specimens: 1. Right inferior parathyroid biopsy, Frozen2. Right inferior parathyroid adenoma, Permanent     References:    Click here for ordering Quick Tip   Question Answer Comment   Procedure Type: Thyroid, Parathyroid, and Adrenals    Specimen Class: Routine/Screening    Release to patient Immediate        08/16/24 1458                           Condition: stable    Disposition: PACU - hemodynamically stable.    Attestation: I was present and scrubbed for the entire procedure.

## 2024-08-16 NOTE — DISCHARGE INSTRUCTIONS
Post-Operative Instructions: Parathyroidectomy    MEDICATIONS  You are being discharged home on the following medications:    Calcium Supplementation  Calcium Carbonate (Tums Ultra): 2 tablets, 2 times a day with meals  One Tums Ultra tablet has 1000 mg calcium carbonate which is equal to 400 mg elemental calcium.  *If you notice any numbness or tingling of the face, hands, or feet, take 2 extra tablets of Tums.  If symptoms do not subside within a half-hour, please call your surgeon's office.  Do NOT take your Tums in the morning of your lab draw.   Calcium carbonate (Tums) can cause constipation.  Please use over the counter stool softeners such as docusate (Colace) or laxatives such as polyethylene glycol (Miralax) to help avoid constipation.         Pain medication  Narcotic medication (Tramadol) has been prescribed by your doctor for post-operative pain.   If you prefer, you may take Tylenol (acetaminophen).  Cepacol lozenges: Use as needed for sore throat.  These can be purchased at the pharmacy.    You may resume taking your normal medications, with the EXCEPTION of the following:  You can restart the following medications 72 hours (3 days) after your surgery unless otherwise instructed by your surgeon or internist/cardiologist:  Apixaban (Eliquis), Clopidogrel (Plavix), Dabigatran (Pradaxa), Dipyridamole (Aggrenox), Rivaroxaban (Xarelto), Warfarin (Coumadin), or any other type of blood thinner you may be taking.  Aspirin & anti-inflammatories (i.e. Goody's, Excedrin, ibuprofen, Advil, Aleve): May begin 72 hours after surgery.  Vitamins, minerals, and herbal supplements: Please wait 1 week after surgery to restart these medications    WOUND CARE  Please use your ice pack for 30 minutes on / 30 minutes off for at least the first 7 days.  You will be discharged from the hospital with your incision covered by skin glue that will stay on until your postoperative appointment.  It is normal to develop a lump under  the incision, this is expected and is related to the healing process.  The lump will gradually go away with time.  You may shower when you return home after the surgery.  No bathing or swimming (do not submerge in water) until cleared by your surgeon.  Please notify your physician if your incision is red, warm/hot, if you have an increase in swelling, any new drainage, or if you have a fever >101.5 F.  Please call 911 or proceed to the Emergency Room if you have difficulty breathing.    ACTIVITIES  You may resume normal activities, depending on your energy level.  Please refrain from driving for at least 3 days, or until you have complete mobility of your neck.  Do not drive if you are taking narcotic pain medication.  Please refrain from heavy lifting (10 lbs.) for 10 days.    If you have any questions or concerns during the daytime, please send a [employer] message to your surgeon or call the surgeon's office at [phone].  On nights and weekends, please call [phone] and ask for the General Surgery Resident on call.    Future Appointments   Date Time Provider Department Center   8/29/2024 10:00 AM Kimmie Washburn MD [facility]   12/2/2024  3:00 PM Belinda Nickerson, Ana, PA-C [facility]      Do NOT take your calcium supplements in the morning of your lab appointment.

## 2024-08-16 NOTE — ANESTHESIA PROCEDURE NOTES
Intubation    Date/Time: 8/16/2024 11:56 AM    Performed by: Gonzales Aguayo MD  Authorized by: Gonzales Aguayo MD    Intubation:     Induction:  Intravenous    Intubated:  Postinduction    Mask Ventilation:  Easy with oral airway    Attempts:  1    Attempted By:  Resident anesthesiologist    Method of Intubation:  Video laryngoscopy    Blade:  Ambrosio 3    Laryngeal View Grade: Grade I - full view of cords      Difficult Airway Encountered?: No      Complications:  None    Airway Device:  EMG ETT (NIMS)    Airway Device Size:  7.0    Style/Cuff Inflation:  Cuffed (inflated to minimal occlusive pressure)    Tube secured:  22    Secured at:  The teeth    Placement Verified By:  Capnometry    Complicating Factors:  None    Findings Post-Intubation:  BS equal bilateral and atraumatic/condition of teeth unchanged

## 2024-08-20 LAB
FINAL PATHOLOGIC DIAGNOSIS: NORMAL
FROZEN SECTION DIAGNOSIS: NORMAL
FROZEN SECTION FOOTNOTE: NORMAL
GROSS: NORMAL
Lab: NORMAL

## 2024-08-26 ENCOUNTER — LAB VISIT (OUTPATIENT)
Dept: LAB | Facility: HOSPITAL | Age: 39
End: 2024-08-26
Attending: STUDENT IN AN ORGANIZED HEALTH CARE EDUCATION/TRAINING PROGRAM
Payer: COMMERCIAL

## 2024-08-26 DIAGNOSIS — Z90.89 S/P PARATHYROIDECTOMY: ICD-10-CM

## 2024-08-26 DIAGNOSIS — E21.3 HYPERPARATHYROIDISM: ICD-10-CM

## 2024-08-26 DIAGNOSIS — Z98.890 S/P PARATHYROIDECTOMY: ICD-10-CM

## 2024-08-26 LAB
ALBUMIN SERPL BCP-MCNC: 3.9 G/DL (ref 3.5–5.2)
ANION GAP SERPL CALC-SCNC: 10 MMOL/L (ref 8–16)
ANION GAP SERPL CALC-SCNC: 9 MMOL/L (ref 8–16)
BUN SERPL-MCNC: 10 MG/DL (ref 6–20)
BUN SERPL-MCNC: 10 MG/DL (ref 6–20)
CALCIUM SERPL-MCNC: 9.4 MG/DL (ref 8.7–10.5)
CALCIUM SERPL-MCNC: 9.6 MG/DL (ref 8.7–10.5)
CHLORIDE SERPL-SCNC: 105 MMOL/L (ref 95–110)
CHLORIDE SERPL-SCNC: 105 MMOL/L (ref 95–110)
CO2 SERPL-SCNC: 23 MMOL/L (ref 23–29)
CO2 SERPL-SCNC: 24 MMOL/L (ref 23–29)
CREAT SERPL-MCNC: 0.9 MG/DL (ref 0.5–1.4)
CREAT SERPL-MCNC: 1 MG/DL (ref 0.5–1.4)
EST. GFR  (NO RACE VARIABLE): >60 ML/MIN/1.73 M^2
EST. GFR  (NO RACE VARIABLE): >60 ML/MIN/1.73 M^2
GLUCOSE SERPL-MCNC: 101 MG/DL (ref 70–110)
GLUCOSE SERPL-MCNC: 104 MG/DL (ref 70–110)
PHOSPHATE SERPL-MCNC: 3 MG/DL (ref 2.7–4.5)
POTASSIUM SERPL-SCNC: 4 MMOL/L (ref 3.5–5.1)
POTASSIUM SERPL-SCNC: 4.1 MMOL/L (ref 3.5–5.1)
SODIUM SERPL-SCNC: 138 MMOL/L (ref 136–145)
SODIUM SERPL-SCNC: 138 MMOL/L (ref 136–145)

## 2024-08-26 PROCEDURE — 80048 BASIC METABOLIC PNL TOTAL CA: CPT | Performed by: INTERNAL MEDICINE

## 2024-08-26 PROCEDURE — 36415 COLL VENOUS BLD VENIPUNCTURE: CPT | Mod: PO | Performed by: STUDENT IN AN ORGANIZED HEALTH CARE EDUCATION/TRAINING PROGRAM

## 2024-08-26 PROCEDURE — 80069 RENAL FUNCTION PANEL: CPT | Performed by: STUDENT IN AN ORGANIZED HEALTH CARE EDUCATION/TRAINING PROGRAM

## 2024-08-26 NOTE — ASSESSMENT & PLAN NOTE
Doing well postoperatively.  Voice adequate.  No hypocalcemia symptoms.    - Discontinue calcium supplements  - Discontinue narcotics  - Reviewed pathology  - Incision care discussed, scar massage and routine scar care information provided  - Recommended daily dietary calcium intake equal to about 9197-3805 mg  - Obtain labs in six months to document cure of primary hyperparathyroidism with RFP and PTH

## 2024-08-26 NOTE — PROGRESS NOTES
Postoperative Endocrine Surgery Clinic Note    Reason for visit / Chief complaint: Postoperative evaluation  Endocrinologist: Dr. Ga   Procedure:  Parathyroidectomy - Right  Procedure Date: 8/16/2024    Subjective:     Luis Andujar returns today for postoperative evaluation, he is approximately 2 weeks post op.    Procedures:   Parathyroidectomy, unilateral right sided exploration, excision of right inferior parathyroid adenoma  Intraoperative monitoring and interpretation of right cranial nerves (vagus and recurrent laryngeal nerves) using the [employer] system  Intraoperative PTH level sampling and interpretation      Intraoperative Findings:   Right inferior parathyroid adenoma was identified.  Confirmed parathyroid tissue with frozen section, reported as hypercellular parathyroid tissue.  Gland excised.  Right superior parathyroid gland identified.  Gland remains in situ and marked with a small clip.  Appropriate decrease in PTH following excision of right superior parathyroid adenoma.  Baseline PTH: 146.4, 10 minutes post-excision: 46.4.  The right recurrent laryngeal nerve and vagus nerve were identified and preserved.  Function was verified using the nerve monitoring system.    Discharge medications:  - Calcium carbonate: 800 mg BID    - Calcitriol: no    He has had an uncomplicated postoperative course. He denies signs or symptoms of hypocalcemia. His phonation is at baseline.  Pain is well controlled. He stopped taking TUMs three days ago after his normal RFP.    Current Outpatient Medications   Medication Sig Dispense Refill    ARIPiprazole (ABILIFY) 20 MG Tab Take 1 tablet (20 mg total) by mouth once daily. 90 tablet 3    OLANZapine (ZYPREXA) 2.5 MG tablet Take 1 tablet (2.5 mg total) by mouth every evening. 90 tablet 3    sertraline (ZOLOFT) 100 MG tablet Take 2 tablets (200 mg total) by mouth once daily. 180 tablet 3     No current facility-administered medications for this visit.     Review of  "patient's allergies indicates:   Allergen Reactions    Mold Hives       Review of Systems  Negative except as per HPI.     Objective:   /67   Pulse 73   Ht 5' 8" (1.727 m)   Wt 114.3 kg (251 lb 14 oz)   SpO2 98%   BMI 38.30 kg/m²     General: alert, well appearing, and in no distress  Neck: neck is flat, no erythema or edema  Incision: well approximated, healing well  Neurological: phonation is normal    Labs:        Pathology:  Final Pathologic Diagnosis   Date Value Ref Range Status   08/16/2024   Final    1.  Right inferior parathyroid (biopsy):  - Hypercellular parathyroid     2. Right inferior parathyroid (excision):  - Hypercellular parathyroid, compatible with parathyroid adenoma       Comment:     Interp By Modesta Giraldo MD, Signed on 08/20/2024 at 10:59         Assessment and Plan     Problem List Items Addressed This Visit       RESOLVED: Serum calcium elevated    Current Assessment & Plan     Normalized.     - Check RFP at 6 months         Primary hyperparathyroidism - Primary    Overview     Symptomatic primary hyperparathyroidism at a young age with hypercalcemia s/p parathyroidectomy with unilateral right sided exploration and excision of right inferior parathyroid adenoma on 8/16/2024.         Current Assessment & Plan     Doing well postoperatively.  Voice adequate.  No hypocalcemia symptoms.    - Discontinue calcium supplements  - Discontinue narcotics  - Reviewed pathology  - Incision care discussed, scar massage and routine scar care information provided  - Recommended daily dietary calcium intake equal to about 1610-0716 mg  - Obtain labs in six months to document cure of primary hyperparathyroidism with RFP and PTH          S/P parathyroidectomy    Overview     Symptomatic primary hyperparathyroidism at a young age with hypercalcemia s/p parathyroidectomy with unilateral right sided exploration and excision of right inferior parathyroid adenoma on 8/16/2024.         Current " Assessment & Plan     - See Primary hyperparathyroidism           Patient was seen and evaluated with surgery resident Ladonna Pedraza MD.    Kimmie Washburn MD  Staff Surgeon  Endocrine Surgery  8/29/24

## 2024-08-29 ENCOUNTER — PATIENT MESSAGE (OUTPATIENT)
Dept: SURGERY | Facility: CLINIC | Age: 39
End: 2024-08-29

## 2024-08-29 ENCOUNTER — OFFICE VISIT (OUTPATIENT)
Dept: SURGERY | Facility: CLINIC | Age: 39
End: 2024-08-29
Payer: COMMERCIAL

## 2024-08-29 VITALS
HEART RATE: 73 BPM | OXYGEN SATURATION: 98 % | WEIGHT: 251.88 LBS | DIASTOLIC BLOOD PRESSURE: 67 MMHG | HEIGHT: 68 IN | BODY MASS INDEX: 38.17 KG/M2 | SYSTOLIC BLOOD PRESSURE: 119 MMHG

## 2024-08-29 DIAGNOSIS — E83.52 SERUM CALCIUM ELEVATED: ICD-10-CM

## 2024-08-29 DIAGNOSIS — Z98.890 S/P PARATHYROIDECTOMY: ICD-10-CM

## 2024-08-29 DIAGNOSIS — E21.0 PRIMARY HYPERPARATHYROIDISM: Primary | ICD-10-CM

## 2024-08-29 DIAGNOSIS — Z90.89 S/P PARATHYROIDECTOMY: ICD-10-CM

## 2024-08-29 PROCEDURE — 3074F SYST BP LT 130 MM HG: CPT | Mod: CPTII,S$GLB,, | Performed by: STUDENT IN AN ORGANIZED HEALTH CARE EDUCATION/TRAINING PROGRAM

## 2024-08-29 PROCEDURE — 99024 POSTOP FOLLOW-UP VISIT: CPT | Mod: S$GLB,,, | Performed by: STUDENT IN AN ORGANIZED HEALTH CARE EDUCATION/TRAINING PROGRAM

## 2024-08-29 PROCEDURE — 99999 PR PBB SHADOW E&M-EST. PATIENT-LVL III: CPT | Mod: PBBFAC,,, | Performed by: STUDENT IN AN ORGANIZED HEALTH CARE EDUCATION/TRAINING PROGRAM

## 2024-08-29 PROCEDURE — 1159F MED LIST DOCD IN RCRD: CPT | Mod: CPTII,S$GLB,, | Performed by: STUDENT IN AN ORGANIZED HEALTH CARE EDUCATION/TRAINING PROGRAM

## 2024-08-29 PROCEDURE — 3078F DIAST BP <80 MM HG: CPT | Mod: CPTII,S$GLB,, | Performed by: STUDENT IN AN ORGANIZED HEALTH CARE EDUCATION/TRAINING PROGRAM

## 2024-09-28 DIAGNOSIS — F25.1 SCHIZOAFFECTIVE DISORDER, DEPRESSIVE TYPE: ICD-10-CM

## 2024-09-30 RX ORDER — ARIPIPRAZOLE 20 MG/1
20 TABLET ORAL DAILY
Qty: 90 TABLET | Refills: 3 | Status: SHIPPED | OUTPATIENT
Start: 2024-09-30 | End: 2025-09-30

## 2024-09-30 RX ORDER — OLANZAPINE 2.5 MG/1
2.5 TABLET ORAL NIGHTLY
Qty: 90 TABLET | Refills: 3 | Status: SHIPPED | OUTPATIENT
Start: 2024-09-30 | End: 2025-09-30

## 2024-10-04 ENCOUNTER — OFFICE VISIT (OUTPATIENT)
Dept: PSYCHIATRY | Facility: CLINIC | Age: 39
End: 2024-10-04
Payer: COMMERCIAL

## 2024-10-04 DIAGNOSIS — F25.1 SCHIZOAFFECTIVE DISORDER, DEPRESSIVE TYPE: Primary | ICD-10-CM

## 2024-10-04 DIAGNOSIS — F41.8 OTHER SPECIFIED ANXIETY DISORDERS: ICD-10-CM

## 2024-10-04 DIAGNOSIS — F41.9 ANXIETY: ICD-10-CM

## 2024-10-04 RX ORDER — SERTRALINE HYDROCHLORIDE 100 MG/1
200 TABLET, FILM COATED ORAL DAILY
Qty: 180 TABLET | Refills: 3 | Status: SHIPPED | OUTPATIENT
Start: 2024-10-04 | End: 2025-10-04

## 2024-10-04 NOTE — PROGRESS NOTES
Outpatient Psychiatry Follow-Up Visit ([facility])    10/4/2024    Clinical Status of Patient:  Outpatient (Ambulatory)    Chief Complaint:  Luis Andujar is a 38 y.o. male who presents today for follow-up of mood disorder and anxiety.  Met with patient.        The patient location is: Home in [place] at address on record  The chief complaint leading to consultation is: F/u schizoaffective disorder and anxiety    Visit type: audiovisual    Face to Face time with patient: 8 min  14 minutes of total time spent on the encounter, which includes face to face time and non-face to face time preparing to see the patient (eg, review of tests), Obtaining and/or reviewing separately obtained history, Documenting clinical information in the electronic or other health record, Independently interpreting results (not separately reported) and communicating results to the patient/family/caregiver, or Care coordination (not separately reported).         Each patient to whom he or she provides medical services by telemedicine is:  (1) informed of the relationship between the physician and patient and the respective role of any other health care provider with respect to management of the patient; and (2) notified that he or she may decline to receive medical services by telemedicine and may withdraw from such care at any time.    Notes:       Interval History and Content of Current Session:  Interim Events/Subjective Report/Content of Current Session: Patient with schizoaffective disorder depressive type and anxiety is currently taking Abilify 20mg daily, Olanzapine 2.5 mg nightly, Zoloft 200 mg daily.  Still doing well, reports he is recovering well from recent parathyroidectomy.  Pitching in around the house more still since mom broke her arm.  Getting ready to go to [place] to visit sister.  States he is starting to think about the common pitfalls in [employer] financial planning.  Sleeping well but sometimes too much, 8pm-8am.  Plans to  start trying to monitor his sleep schedule more closely to move more toward 8 hours per night.    Review of Systems   PSYCHIATRIC: Pertinant items are noted in the narrative.    Past Medical, Family and Social History: The patient's past medical, family and social history have been reviewed and updated as appropriate within the electronic medical record - see encounter notes.    Compliance: yes    Side effects: None    Risk Parameters:  Patient reports no suicidal ideation  Patient reports no homicidal ideation  Patient reports no self-injurious behavior  Patient reports no violent behavior    Exam (detailed: at least 9 elements; comprehensive: all 15 elements)   Constitutional  Vitals:  Most recent vital signs, dated less than 90 days prior to this appointment, were reviewed.   There were no vitals filed for this visit.     General:  unremarkable, age appropriate, casually dressed     Musculoskeletal  Muscle Strength/Tone:  not examined   Gait & Station:  unobserved     Psychiatric  Speech:  no latency; no press, monotone   Mood & Affect:  steady, euthymic  flat   Thought Process:  normal and logical   Associations:  intact   Thought Content:  normal, no suicidality, no homicidality, delusions, or paranoia   Insight:  intact, has awareness of illness   Judgement: behavior is adequate to circumstances.   Orientation:  grossly intact   Memory: intact for content of interview   Language: grossly intact   Attention Span & Concentration:  able to focus   Fund of Knowledge:  intact and appropriate to age and level of education     Assessment and Diagnosis   Status/Progress: Based on the examination today, the patient's problem(s) is/are well controlled.  New problems have not been presented today.   Lack of compliance are not complicating management of the primary condition.  There are no active rule-out diagnoses for this patient at this time.     General Impression: Schizoaffective disorder depressive type and anxiety  disorder, depression well treated with sertraline 200 mg daily, Abilify 20 mg daily, and olanzapine 2.5 mg nightly.  Would like to continue current regimen.        ICD-10-CM ICD-9-CM   1. Schizoaffective disorder, depressive type  F25.1 295.70   2. Other specified anxiety disorders  F41.8 300.09     1. Continue sertraline 200 mg daily, Abilify 20 mg daily, olanzapine 2.5 mg nightly.    2. F/U 6 months    Intervention/Counseling/Treatment Plan   Medication Management: The risks and benefits of medication were discussed with the patient.         Return to Clinic: 6 months    Visit today included increased complexity associated with the care of the episodic problem stress addressed and managing the longitudinal care of the patient due to the serious and/or complex managed problem(s) schizoaffective disorder depressive type and anxiety.

## 2024-12-08 ENCOUNTER — PATIENT MESSAGE (OUTPATIENT)
Dept: SURGERY | Facility: CLINIC | Age: 39
End: 2024-12-08
Payer: COMMERCIAL

## 2025-01-08 DIAGNOSIS — F25.1 SCHIZOAFFECTIVE DISORDER, DEPRESSIVE TYPE: ICD-10-CM

## 2025-01-08 RX ORDER — OLANZAPINE 2.5 MG/1
2.5 TABLET ORAL NIGHTLY
Qty: 90 TABLET | Refills: 3 | Status: SHIPPED | OUTPATIENT
Start: 2025-01-08 | End: 2026-01-08

## 2025-01-08 RX ORDER — ARIPIPRAZOLE 20 MG/1
20 TABLET ORAL DAILY
Qty: 90 TABLET | Refills: 3 | Status: SHIPPED | OUTPATIENT
Start: 2025-01-08 | End: 2026-01-08

## 2025-02-03 ENCOUNTER — OFFICE VISIT (OUTPATIENT)
Dept: PSYCHIATRY | Facility: CLINIC | Age: 40
End: 2025-02-03
Payer: COMMERCIAL

## 2025-02-03 DIAGNOSIS — F25.1 SCHIZOAFFECTIVE DISORDER, DEPRESSIVE TYPE: Primary | ICD-10-CM

## 2025-02-03 DIAGNOSIS — F41.9 ANXIETY: ICD-10-CM

## 2025-02-03 PROCEDURE — 1160F RVW MEDS BY RX/DR IN RCRD: CPT | Mod: CPTII,95,, | Performed by: PHYSICIAN ASSISTANT

## 2025-02-03 PROCEDURE — 98006 SYNCH AUDIO-VIDEO EST MOD 30: CPT | Mod: 95,,, | Performed by: PHYSICIAN ASSISTANT

## 2025-02-03 PROCEDURE — 1159F MED LIST DOCD IN RCRD: CPT | Mod: CPTII,95,, | Performed by: PHYSICIAN ASSISTANT

## 2025-02-03 PROCEDURE — G2211 COMPLEX E/M VISIT ADD ON: HCPCS | Mod: 95,,, | Performed by: PHYSICIAN ASSISTANT

## 2025-02-03 NOTE — PROGRESS NOTES
Outpatient Psychiatry Follow-Up Visit ([facility])    2/3/2025    Clinical Status of Patient:  Outpatient (Ambulatory)    Chief Complaint:  Luis Andujar is a 39 y.o. male who presents today for follow-up of mood disorder and anxiety.  Met with patient.        The patient location is: Home in [place] at address on record  The chief complaint leading to consultation is: F/u schizoaffective disorder and anxiety    Visit type: audiovisual    Face to Face time with patient: 10 min  20 minutes of total time spent on the encounter, which includes face to face time and non-face to face time preparing to see the patient (eg, review of tests), Obtaining and/or reviewing separately obtained history, Documenting clinical information in the electronic or other health record, Independently interpreting results (not separately reported) and communicating results to the patient/family/caregiver, or Care coordination (not separately reported).         Each patient to whom he or she provides medical services by telemedicine is:  (1) informed of the relationship between the physician and patient and the respective role of any other health care provider with respect to management of the patient; and (2) notified that he or she may decline to receive medical services by telemedicine and may withdraw from such care at any time.    Notes:       Interval History and Content of Current Session:  Interim Events/Subjective Report/Content of Current Session: Patient with schizoaffective disorder depressive type and anxiety is currently taking Abilify 20mg daily, Olanzapine 2.5 mg nightly, Zoloft 200 mg daily.  He states that he is still doing well mentally, denies psychosis, is participating in family and home life, and mood is good.  He states that he is now concerned about his weight.  Has been able to lost 5 lbs or so through increased exercise and improved diet, but would like to adjust his medications.  Looks like about a year and a half  ago his lipids were elevated and was going to be prescribed a statin, but it is now not on his med list.      He states that he takes his aripiprazole and sertraline in the AM, and that the aripiprazole does make him a little tired.  Then takes olanzapine at around 5 PM, goes to bed around 9 PM, asleep by 10 PM, and wakes up at 6 AM to take his mom to work.    Review of Systems   PSYCHIATRIC: Pertinant items are noted in the narrative.    Past Medical, Family and Social History: The patient's past medical, family and social history have been reviewed and updated as appropriate within the electronic medical record - see encounter notes.    Compliance: yes    Side effects: weight gain    Risk Parameters:  Patient reports no suicidal ideation  Patient reports no homicidal ideation  Patient reports no self-injurious behavior  Patient reports no violent behavior    Exam (detailed: at least 9 elements; comprehensive: all 15 elements)   Constitutional  Vitals:  Most recent vital signs, dated less than 90 days prior to this appointment, were reviewed.   There were no vitals filed for this visit.     General:  unremarkable, age appropriate, casually dressed     Musculoskeletal  Muscle Strength/Tone:  not examined   Gait & Station:  unobserved     Psychiatric  Speech:  no latency; no press, monotone   Mood & Affect:  steady, euthymic  flat   Thought Process:  normal and logical   Associations:  intact   Thought Content:  normal, no suicidality, no homicidality, delusions, or paranoia   Insight:  intact, has awareness of illness   Judgement: behavior is adequate to circumstances.   Orientation:  grossly intact   Memory: intact for content of interview   Language: grossly intact   Attention Span & Concentration:  able to focus   Fund of Knowledge:  intact and appropriate to age and level of education     Assessment and Diagnosis   Status/Progress: Based on the examination today, the patient's problem(s) is/are well controlled.   New problems have not been presented today.   Lack of compliance are not complicating management of the primary condition.  There are no active rule-out diagnoses for this patient at this time.     General Impression: Schizoaffective disorder depressive type and anxiety disorder, depression well treated with sertraline 200 mg daily, Abilify 20 mg daily, and olanzapine 2.5 mg nightly.  Will try coming off of the low dose olanzapine all together, and moving aripiprazole to bedtime.  If he is still struggling with getting the weight off, we will discuss switching aripiprazole to Caplyta or Vraylar, vs seeing his PCP for weight loss medication.      ICD-10-CM ICD-9-CM   1. Schizoaffective disorder, depressive type  F25.1 295.70   2. Anxiety  F41.9 300.00       Stop olanzapine  Change aripiprazole 20 mg to QPM  Continue sertraline 200 mg daily.  F/u 1 month  Rechecking lipids and A1c    Intervention/Counseling/Treatment Plan   Medication Management: The risks and benefits of medication were discussed with the patient.         Return to Clinic: 1 month    Visit today included increased complexity associated with the care of the episodic problem stress addressed and managing the longitudinal care of the patient due to the serious and/or complex managed problem(s) schizoaffective disorder depressive type and anxiety.

## 2025-02-04 ENCOUNTER — LAB VISIT (OUTPATIENT)
Dept: LAB | Facility: HOSPITAL | Age: 40
End: 2025-02-04
Attending: PHYSICIAN ASSISTANT
Payer: COMMERCIAL

## 2025-02-04 DIAGNOSIS — F25.1 SCHIZOAFFECTIVE DISORDER, DEPRESSIVE TYPE: ICD-10-CM

## 2025-02-04 LAB
CHOLEST SERPL-MCNC: 211 MG/DL (ref 120–199)
CHOLEST/HDLC SERPL: 5 {RATIO} (ref 2–5)
ESTIMATED AVG GLUCOSE: 111 MG/DL (ref 68–131)
HBA1C MFR BLD: 5.5 % (ref 4–5.6)
HDLC SERPL-MCNC: 42 MG/DL (ref 40–75)
HDLC SERPL: 19.9 % (ref 20–50)
LDLC SERPL CALC-MCNC: 131.8 MG/DL (ref 63–159)
NONHDLC SERPL-MCNC: 169 MG/DL
TRIGL SERPL-MCNC: 186 MG/DL (ref 30–150)

## 2025-02-04 PROCEDURE — 83036 HEMOGLOBIN GLYCOSYLATED A1C: CPT | Performed by: PHYSICIAN ASSISTANT

## 2025-02-04 PROCEDURE — 36415 COLL VENOUS BLD VENIPUNCTURE: CPT | Performed by: PHYSICIAN ASSISTANT

## 2025-02-04 PROCEDURE — 80061 LIPID PANEL: CPT | Performed by: PHYSICIAN ASSISTANT

## 2025-02-07 ENCOUNTER — PATIENT MESSAGE (OUTPATIENT)
Dept: PSYCHIATRY | Facility: CLINIC | Age: 40
End: 2025-02-07
Payer: COMMERCIAL

## 2025-02-21 ENCOUNTER — OFFICE VISIT (OUTPATIENT)
Dept: INTERNAL MEDICINE | Facility: CLINIC | Age: 40
End: 2025-02-21
Payer: COMMERCIAL

## 2025-02-21 VITALS
OXYGEN SATURATION: 100 % | BODY MASS INDEX: 36.25 KG/M2 | HEIGHT: 68 IN | DIASTOLIC BLOOD PRESSURE: 78 MMHG | WEIGHT: 239.19 LBS | HEART RATE: 85 BPM | SYSTOLIC BLOOD PRESSURE: 128 MMHG

## 2025-02-21 DIAGNOSIS — E21.0 PRIMARY HYPERPARATHYROIDISM: ICD-10-CM

## 2025-02-21 DIAGNOSIS — E78.2 MIXED HYPERLIPIDEMIA: ICD-10-CM

## 2025-02-21 DIAGNOSIS — E66.01 SEVERE OBESITY (BMI 35.0-35.9 WITH COMORBIDITY): ICD-10-CM

## 2025-02-21 DIAGNOSIS — Z90.89 S/P PARATHYROIDECTOMY: ICD-10-CM

## 2025-02-21 DIAGNOSIS — F25.1 SCHIZOAFFECTIVE DISORDER, DEPRESSIVE TYPE: ICD-10-CM

## 2025-02-21 DIAGNOSIS — Z98.890 S/P PARATHYROIDECTOMY: ICD-10-CM

## 2025-02-21 DIAGNOSIS — F41.9 ANXIETY: ICD-10-CM

## 2025-02-21 DIAGNOSIS — K76.0 FATTY LIVER DISEASE, NONALCOHOLIC: Primary | ICD-10-CM

## 2025-02-21 DIAGNOSIS — Z00.00 LABORATORY EXAMINATION ORDERED AS PART OF A ROUTINE GENERAL MEDICAL EXAMINATION: ICD-10-CM

## 2025-02-21 PROBLEM — E78.00 HYPERCHOLESTEROLEMIA: Status: ACTIVE | Noted: 2025-02-21

## 2025-02-21 PROCEDURE — 3044F HG A1C LEVEL LT 7.0%: CPT | Mod: CPTII,S$GLB,, | Performed by: INTERNAL MEDICINE

## 2025-02-21 PROCEDURE — 3008F BODY MASS INDEX DOCD: CPT | Mod: CPTII,S$GLB,, | Performed by: INTERNAL MEDICINE

## 2025-02-21 PROCEDURE — 99999 PR PBB SHADOW E&M-EST. PATIENT-LVL IV: CPT | Mod: PBBFAC,,, | Performed by: INTERNAL MEDICINE

## 2025-02-21 PROCEDURE — 1159F MED LIST DOCD IN RCRD: CPT | Mod: CPTII,S$GLB,, | Performed by: INTERNAL MEDICINE

## 2025-02-21 PROCEDURE — 3078F DIAST BP <80 MM HG: CPT | Mod: CPTII,S$GLB,, | Performed by: INTERNAL MEDICINE

## 2025-02-21 PROCEDURE — 3074F SYST BP LT 130 MM HG: CPT | Mod: CPTII,S$GLB,, | Performed by: INTERNAL MEDICINE

## 2025-02-21 PROCEDURE — 99214 OFFICE O/P EST MOD 30 MIN: CPT | Mod: S$GLB,,, | Performed by: INTERNAL MEDICINE

## 2025-02-21 NOTE — PROGRESS NOTES
"Subjective:       Patient ID: Luis Andujar is a 39 y.o. male.    Chief Complaint: Follow-up      HPI  Luis Andujar is a 39 y.o. year old male with HLD, primary hyperparathyroidism s/p parathyroidectomy, schizoaffective disorder presents for clinic examination. Follows with psychiatry. Has questions about lifestyle changes for better health.     Review of Systems   Constitutional:  Negative for activity change, appetite change, chills, fatigue, fever and unexpected weight change.   HENT:  Negative for congestion, rhinorrhea and sore throat.    Eyes:  Negative for visual disturbance.   Respiratory:  Negative for shortness of breath.    Cardiovascular:  Negative for chest pain.   Gastrointestinal:  Negative for abdominal pain, diarrhea, nausea and vomiting.   Genitourinary:  Negative for difficulty urinating and dysuria.   Musculoskeletal:  Negative for arthralgias, back pain and myalgias.   Skin:  Negative for color change and rash.   Neurological:  Negative for dizziness, weakness and headaches.         Past Medical History:   Diagnosis Date    Anxiety     Depression     Rhinitis     Serum calcium elevated 10/05/2023        Prior to Admission medications    Medication Sig Start Date End Date Taking? Authorizing Provider   ARIPiprazole (ABILIFY) 20 MG Tab Take 1 tablet (20 mg total) by mouth once daily. 1/8/25 1/8/26 Yes Belinda Nickerson DMSc, PA-C   sertraline (ZOLOFT) 100 MG tablet Take 2 tablets (200 mg total) by mouth once daily. 10/4/24 10/4/25 Yes Belinda Nickerson DMSc, PA-C        Past medical history, surgical history, and family medical history reviewed and updated as appropriate.    Medications and allergies reviewed.     Objective:          Vitals:    02/21/25 0959   BP: 128/78   BP Location: Right arm   Patient Position: Sitting   Pulse: 85   SpO2: 100%   Weight: 108.5 kg (239 lb 3.2 oz)   Height: 5' 8" (1.727 m)     Body mass index is 36.37 kg/m².  Physical Exam  Constitutional:       " General: He is not in acute distress.     Appearance: He is well-developed. He is obese.   HENT:      Head: Normocephalic and atraumatic.      Nose: Nose normal.   Eyes:      General: No scleral icterus.     Extraocular Movements: Extraocular movements intact.   Neck:      Thyroid: No thyromegaly.      Vascular: No JVD.      Trachea: No tracheal deviation.   Cardiovascular:      Rate and Rhythm: Normal rate and regular rhythm.      Heart sounds: Normal heart sounds. No murmur heard.     No friction rub. No gallop.   Pulmonary:      Effort: Pulmonary effort is normal. No respiratory distress.      Breath sounds: Normal breath sounds. No wheezing or rales.   Abdominal:      General: Bowel sounds are normal. There is no distension.      Palpations: Abdomen is soft. There is no mass.      Tenderness: There is no abdominal tenderness.   Musculoskeletal:         General: No tenderness. Normal range of motion.      Cervical back: Normal range of motion and neck supple.   Lymphadenopathy:      Cervical: No cervical adenopathy.   Skin:     General: Skin is warm and dry.      Findings: No rash.   Neurological:      Mental Status: He is alert and oriented to person, place, and time.      Cranial Nerves: No cranial nerve deficit.      Deep Tendon Reflexes: Reflexes normal.   Psychiatric:         Behavior: Behavior normal.         Lab Results   Component Value Date    WBC 8.82 06/25/2024    HGB 14.1 06/25/2024    HCT 43.6 06/25/2024     06/25/2024    CHOL 211 (H) 02/04/2025    TRIG 186 (H) 02/04/2025    HDL 42 02/04/2025    ALT 24 10/12/2022    AST 27 10/12/2022     02/04/2025    K 4.5 02/04/2025     02/04/2025    CREATININE 0.9 02/04/2025    BUN 12 02/04/2025    CO2 25 02/04/2025    TSH 2.426 08/05/2020    HGBA1C 5.5 02/04/2025       Assessment:       1. Fatty liver disease, nonalcoholic    2. Mixed hyperlipidemia    3. Severe obesity (BMI 35.0-35.9 with comorbidity)    4. Primary hyperparathyroidism    5.  S/P parathyroidectomy    6. Schizoaffective disorder, depressive type    7. Anxiety    8. Laboratory examination ordered as part of a routine general medical examination          Plan:     Luis was seen today for follow-up.    Diagnoses and all orders for this visit:    Fatty liver disease, nonalcoholic  Comments:  seen on CT imaging 2017; recheck hepatic enzymes  Orders:  -     US Abdomen Limited; Future    Mixed hyperlipidemia  -     Comprehensive Metabolic Panel; Future  -     Lipid Panel; Future    Severe obesity (BMI 35.0-35.9 with comorbidity)    Primary hyperparathyroidism    S/P parathyroidectomy    Schizoaffective disorder, depressive type    Anxiety    Laboratory examination ordered as part of a routine general medical examination  -     CBC Auto Differential; Future  -     Comprehensive Metabolic Panel; Future  -     TSH; Future  -     Lipid Panel; Future    Schedule ultrasound of liver  Fasting labwork in 6 months    Return to clinic in 6 months.     Felice Belle MD  Internal Medicine / Primary Care  [facility]  2/21/2025

## 2025-02-21 NOTE — PATIENT INSTRUCTIONS
Schedule ultrasound of liver  Fasting labwork in 6 months    The american heart association recommends at least 150 minutes of cardiovascular exercise per week (30 minutes x 5 days a week).     The american heart association recommends a mediterranean diet. Look this up and read about it.    Return to clinic in 6 months.

## 2025-05-19 ENCOUNTER — OFFICE VISIT (OUTPATIENT)
Dept: PSYCHIATRY | Facility: CLINIC | Age: 40
End: 2025-05-19
Payer: COMMERCIAL

## 2025-05-19 DIAGNOSIS — F25.1 SCHIZOAFFECTIVE DISORDER, DEPRESSIVE TYPE: Primary | ICD-10-CM

## 2025-05-19 DIAGNOSIS — F41.9 ANXIETY: ICD-10-CM

## 2025-05-19 PROCEDURE — 98006 SYNCH AUDIO-VIDEO EST MOD 30: CPT | Mod: 95,,, | Performed by: PHYSICIAN ASSISTANT

## 2025-05-19 PROCEDURE — 1159F MED LIST DOCD IN RCRD: CPT | Mod: CPTII,95,, | Performed by: PHYSICIAN ASSISTANT

## 2025-05-19 PROCEDURE — G2211 COMPLEX E/M VISIT ADD ON: HCPCS | Mod: 95,,, | Performed by: PHYSICIAN ASSISTANT

## 2025-05-19 PROCEDURE — 1160F RVW MEDS BY RX/DR IN RCRD: CPT | Mod: CPTII,95,, | Performed by: PHYSICIAN ASSISTANT

## 2025-05-19 PROCEDURE — 3044F HG A1C LEVEL LT 7.0%: CPT | Mod: CPTII,95,, | Performed by: PHYSICIAN ASSISTANT

## 2025-05-19 NOTE — PROGRESS NOTES
"Outpatient Psychiatry Follow-Up Visit (PA-C)    5/19/2025    Clinical Status of Patient:  Outpatient (Ambulatory)    Chief Complaint:  Luis Andujar is a 39 y.o. male who presents today for follow-up of mood disorder and anxiety.  Met with patient.        The patient location is: Home in [place] at address on record  The chief complaint leading to consultation is: F/u schizoaffective disorder and anxiety    Visit type: audiovisual    Face to Face time with patient: 10 min  20 minutes of total time spent on the encounter, which includes face to face time and non-face to face time preparing to see the patient (eg, review of tests), Obtaining and/or reviewing separately obtained history, Documenting clinical information in the electronic or other health record, Independently interpreting results (not separately reported) and communicating results to the patient/family/caregiver, or Care coordination (not separately reported).         Each patient to whom he or she provides medical services by telemedicine is:  (1) informed of the relationship between the physician and patient and the respective role of any other health care provider with respect to management of the patient; and (2) notified that he or she may decline to receive medical services by telemedicine and may withdraw from such care at any time.    Notes:       Interval History and Content of Current Session:  Interim Events/Subjective Report/Content of Current Session: Patient with schizoaffective disorder depressive type and anxiety.  History of Present Illness    MEDICATIONS:  Mr. Andujar is currently on Abilify 20 mg taken orally at night and Zoloft 200 mg taken orally during the day. Zyprexa has been discontinued.    HPI:  Mr. Andujar reports doing well mentally, with no specific concerns or issues. He has lost 20 lbs recently, which he views positively. Mr. Andujar reports being "very busy" and more active, describing it as "busy in a good way." He " mentions getting more things done, suggesting an increase in productivity. Mr. Andujar confirms he is still taking Abilify 20 mg at night and Zoloft 200 mg during the day, but is no longer taking Zyprexa. He appears to be doing well overall, with improvements in physical activity, weight loss, and maintained mental stability. Mr. Andujar denies having diabetes, as confirmed by recent labs.    ALLERGIES:  No allergies are reported.      ROS:  General: +weight loss, +increased activities    All other review of systems negative unless otherwise noted in the HPI.         Review of Systems   PSYCHIATRIC: Pertinant items are noted in the narrative.    Past Medical, Family and Social History: The patient's past medical, family and social history have been reviewed and updated as appropriate within the electronic medical record - see encounter notes.    Compliance: yes    Side effects: weight gain    Risk Parameters:  Patient reports no suicidal ideation  Patient reports no homicidal ideation  Patient reports no self-injurious behavior  Patient reports no violent behavior    Exam (detailed: at least 9 elements; comprehensive: all 15 elements)   Constitutional  Vitals:  Most recent vital signs, dated less than 90 days prior to this appointment, were reviewed.   There were no vitals filed for this visit.     General:  unremarkable, age appropriate, casually dressed     Musculoskeletal  Muscle Strength/Tone:  not examined   Gait & Station:  unobserved     Psychiatric  Speech:  no latency; no press, monotone   Mood & Affect:  steady, euthymic  flat   Thought Process:  normal and logical   Associations:  intact   Thought Content:  normal, no suicidality, no homicidality, delusions, or paranoia   Insight:  intact, has awareness of illness   Judgement: behavior is adequate to circumstances.   Orientation:  grossly intact   Memory: intact for content of interview   Language: grossly intact   Attention Span & Concentration:  able to  focus   Fund of Knowledge:  intact and appropriate to age and level of education     Assessment and Diagnosis   Status/Progress: Based on the examination today, the patient's problem(s) is/are well controlled.  New problems have not been presented today.   Lack of compliance are not complicating management of the primary condition.  There are no active rule-out diagnoses for this patient at this time.     General Impression: Schizoaffective disorder depressive type and anxiety disorder, depression well treated with sertraline 200 mg daily, Abilify 20 mg qHS.  Weight controlled with diet.    ICD-10-CM ICD-9-CM   1. Schizoaffective disorder, depressive type  F25.1 295.70   2. Anxiety  F41.9 300.00         Continue aripiprazole 20 mg QPM  Continue sertraline 200 mg daily.  F/u 6 months    Intervention/Counseling/Treatment Plan   Medication Management: The risks and benefits of medication were discussed with the patient.         Return to Clinic: 6 months    Visit today included increased complexity associated with the care of the episodic problem stress addressed and managing the longitudinal care of the patient due to the serious and/or complex managed problem(s) schizoaffective disorder depressive type and anxiety.

## 2025-05-22 ENCOUNTER — PATIENT MESSAGE (OUTPATIENT)
Dept: PSYCHIATRY | Facility: CLINIC | Age: 40
End: 2025-05-22
Payer: COMMERCIAL

## 2025-05-26 ENCOUNTER — OFFICE VISIT (OUTPATIENT)
Dept: OTOLARYNGOLOGY | Facility: CLINIC | Age: 40
End: 2025-05-26
Payer: COMMERCIAL

## 2025-05-26 VITALS
DIASTOLIC BLOOD PRESSURE: 87 MMHG | SYSTOLIC BLOOD PRESSURE: 131 MMHG | HEIGHT: 68 IN | BODY MASS INDEX: 36.25 KG/M2 | WEIGHT: 239.19 LBS

## 2025-05-26 DIAGNOSIS — J32.0 CHRONIC MAXILLARY SINUSITIS: ICD-10-CM

## 2025-05-26 DIAGNOSIS — R09.81 NASAL CONGESTION: ICD-10-CM

## 2025-05-26 DIAGNOSIS — J33.9 NASAL POLYPS: Primary | ICD-10-CM

## 2025-05-26 DIAGNOSIS — J32.2 CHRONIC ETHMOIDAL SINUSITIS: ICD-10-CM

## 2025-05-26 PROCEDURE — 31231 NASAL ENDOSCOPY DX: CPT | Mod: S$GLB,,, | Performed by: OTOLARYNGOLOGY

## 2025-05-26 PROCEDURE — 99204 OFFICE O/P NEW MOD 45 MIN: CPT | Mod: 25,S$GLB,, | Performed by: OTOLARYNGOLOGY

## 2025-05-26 PROCEDURE — 3075F SYST BP GE 130 - 139MM HG: CPT | Mod: CPTII,S$GLB,, | Performed by: OTOLARYNGOLOGY

## 2025-05-26 PROCEDURE — 3044F HG A1C LEVEL LT 7.0%: CPT | Mod: CPTII,S$GLB,, | Performed by: OTOLARYNGOLOGY

## 2025-05-26 PROCEDURE — 3008F BODY MASS INDEX DOCD: CPT | Mod: CPTII,S$GLB,, | Performed by: OTOLARYNGOLOGY

## 2025-05-26 PROCEDURE — 3079F DIAST BP 80-89 MM HG: CPT | Mod: CPTII,S$GLB,, | Performed by: OTOLARYNGOLOGY

## 2025-05-26 RX ORDER — BUDESONIDE 0.5 MG/2ML
INHALANT ORAL
Qty: 180 ML | Refills: 3 | Status: SHIPPED | OUTPATIENT
Start: 2025-05-26

## 2025-05-26 NOTE — PROGRESS NOTES
OTOLARYNGOLOGY CLINIC NOTE  Date:  05/26/2025     Chief complaint:  Chief Complaint   Patient presents with    Nasal Congestion     Was on dupixant and stopped due to going through parathyroid work up. Mom thinks he has polyps       History of Present Illness  Luis Andujar is a 39 y.o. male  presenting today for a new evaluation and treatment of chronic sinus issues and nasal polyps. He is here today with his mom. He has never had sinus surgery .Did dupixent for 4 months. Was getting dupixent from an ent in [facility] I do not have the records; he is now wondering if he should have surgery . He has been off of dupixent for 6 months   He has not gone back to the ent that did the dupixent and wants a second opinion     Left nose is having more issues; gets rhinorrhea and congestion bilaterally but more so on the left. When he was on dupixent breathing improved significantly and he was able to smell     Recently had parathyroid surgery and states that he went off of dupixent because of other medical issues /surgeries and because he was feeling better.       Past Medical History  Past Medical History:   Diagnosis Date    Anxiety     Depression     Rhinitis     Serum calcium elevated 10/05/2023        Past Surgical History  Past Surgical History:   Procedure Laterality Date    PARATHYROIDECTOMY Right 8/16/2024    Procedure: PARATHYROIDECTOMY;  Surgeon: Kimmie Washburn MD;  Location: [facility] OR [address] [place] [address];  Service: General;  Laterality: Right;        Medications  Medications Ordered Prior to Encounter[1]    Review of Systems  Review of Systems   Constitutional:  Positive for malaise/fatigue.   HENT:  Positive for nosebleeds.    Eyes: Negative.    Respiratory: Negative.     Cardiovascular: Negative.    Gastrointestinal: Negative.    Genitourinary: Negative.    Musculoskeletal: Negative.    Skin: Negative.    Psychiatric/Behavioral: Negative.        Answers submitted by the patient for this visit:  Review of Symptoms  "Questionnaire  (Submitted on 5/23/2025)  sinus pressure : Yes  Sinus infection(s)?: Yes  postnasal drip: Yes  Light-headedness: Yes      Social History   reports that he has never smoked. He has never used smokeless tobacco. He reports that he does not drink alcohol and does not use drugs.     Family History  No family history on file.     Physical Exam   Vitals:    05/26/25 1026   BP: 131/87    Body mass index is 36.37 kg/m².  Weight: 108.5 kg (239 lb 3.2 oz)   Height: 5' 8" (172.7 cm)     GENERAL: no acute distress.  HEAD: normocephalic.   EYES: lids and lashes normal. No scleral icterus  EARS: external ear without lesion, normal pinna shape and position.    NOSE: external nose without significant bony abnormality; anterior rhinoscopy polyps left worse than right  ORAL CAVITY/OROPHARYNX: tongue midline and mobile. Symmetric palate rise. Uvula midline.   NECK: trachea midline.   RESPIRATORY: no stridor, no stertor. Voice normal. Respirations nonlabored.  NEURO: alert, responds to questions appropriately.   PSYCH:mood appropriate    PROCEDURE NOTE  Procedure: diagnostic rigid nasal endoscopy  Indications for procedure: chronic nasal congestion, unable to view sinus area and/or pathology noted on anterior rhinoscopy requiring more thorough nasal evaluation       Consent: procedure was explained in detail and verbal consent was obtained.   Anesthesia:4% lidocaine with neosynephrine  Procedure in detail: With the patient in the seated position, the zero degree endoscope was inserted atraumatically into the bilateral nasal cavities and advance to the nasopharynx with the following areas examined with findings as described below.     Nasal cavity:Left polyps throughout nasal cavity and right with polyps at mid nasal cavity but does have tunnel inferiorly without polyps; no purulent drainage, no bleeding  Septum: no perforation , relatively midline but difficult to evaluate entirely with extent of polyps- may have slight " caudal deviation to right and then slight deviation to left  Turbinates:  inferior turbinates hypertrophied; middle turbinates with polypoid changes /obstructed view due to polyps  Middle Meati: polyps and severe edema  Spheno-ethmoid recess: polyps and edema  Superior meatus:polyps  Nasopharynx: no mass or lesion in the nasopharynx.     The scope was removed atraumatically without complication. The patient tolerated the procedure well. Photodocumentation obtained , all images and/or videos uploaded in media section of epic.                                              Imaging:  The patient does have pertinent but not recent imaging of the head and neck. Ct neck 4-2024 reviewed to evaluate paranasal sinus disease  Ethmoid opacification     Cyst and mild mucosal thickening maxillary sinuses, anterior ethmoids partial opacification    Ethmoids with mucosal thickening bilaterally; mucosu retention cyst left maxillary sinus, sphenoids patent    Labs:  CBC  Recent Labs   Lab 10/12/22  1502 06/25/24  0803   WBC 11.14 8.82   Hemoglobin 13.8 L 14.1   Hematocrit 45.2 43.6   MCV 87 82   Platelets 266 255     BMP  Recent Labs   Lab 06/25/24  0803 08/26/24  0718 02/04/25  0755   Glucose 109 104  101 104   Sodium 139 138  138 142   Potassium 4.0 4.1  4.0 4.5   Chloride 106 105  105 107   CO2 24 24 23 25   BUN 15 10  10 12   Creatinine 1.0 1.0  0.9 0.9   Calcium 10.0 9.4  9.6 9.6   Phosphorus 2.6 L 3.0 2.8     COAGS        Assessment  1. Nasal polyps  - Ambulatory referral/consult to Allergy; Future    2. Chronic maxillary sinusitis    3. Chronic ethmoidal sinusitis    4. Nasal congestion       Plan:  Discussed plan of care with patient in detail and all questions answered. Patient reported understanding of plan of care. I gave the patient the opportunity to ask questions and patient confirmed all questions answered to satisfaction.     Spent a long time discusing about surgery vs dupixent ; discussed pathophys of  polyps.   Discussed about surgical options.     Offered surgery as well and discussed potential risks . Would most likely need dupixent even if did get surgery- most people do have regrowth of polyps after surgery , amount of time to regrowth can vary . Unclear burden of disease when had been placed on dupixent but today exam seems a bit more than what I would expect from his ct done in 2024. I have seen good results in patients with his amount of polyps responding to dupixent. It is not entirely clear from research studies at this time if patients that do not undergo surgery ever have the same long term outcomes on dupixent as compared to those who have had surgery. He would need updated ct scan if going to get surgery. Offered this and he was leaning toward surgery he thought but then changed his mind again at end of visit.   Will start budesonide at a minimum, discussed likely will not reduce polyps that much and in order to get control of nasal congestion needs to get rid of polyps either with surgery dupixent of both. Discussed that I do not do dupixent rx and that is done by allergy md. I am not sure if prior auth is still valid and I do not have experience with this. He may be able to get more quickly into his original ent, he prefers to see allergist at [facility].     F/u 4-6 months with scope, sooner if elects to do surgery . Stressed importance of regular scope exams for monitoring regardless of tx                [1]   Current Outpatient Medications on File Prior to Visit   Medication Sig Dispense Refill    ARIPiprazole (ABILIFY) 20 MG Tab Take 1 tablet (20 mg total) by mouth once daily. 90 tablet 3    sertraline (ZOLOFT) 100 MG tablet Take 2 tablets (200 mg total) by mouth once daily. 180 tablet 3     No current facility-administered medications on file prior to visit.

## 2025-05-27 ENCOUNTER — OFFICE VISIT (OUTPATIENT)
Dept: ALLERGY | Facility: CLINIC | Age: 40
End: 2025-05-27
Payer: COMMERCIAL

## 2025-05-27 VITALS — HEIGHT: 68 IN | WEIGHT: 237.19 LBS | BODY MASS INDEX: 35.95 KG/M2

## 2025-05-27 DIAGNOSIS — J32.9 CHRONIC RHINOSINUSITIS: ICD-10-CM

## 2025-05-27 DIAGNOSIS — J33.9 NASAL POLYPS: Primary | ICD-10-CM

## 2025-05-27 PROCEDURE — 3044F HG A1C LEVEL LT 7.0%: CPT | Mod: CPTII,S$GLB,, | Performed by: STUDENT IN AN ORGANIZED HEALTH CARE EDUCATION/TRAINING PROGRAM

## 2025-05-27 PROCEDURE — 1159F MED LIST DOCD IN RCRD: CPT | Mod: CPTII,S$GLB,, | Performed by: STUDENT IN AN ORGANIZED HEALTH CARE EDUCATION/TRAINING PROGRAM

## 2025-05-27 PROCEDURE — 1160F RVW MEDS BY RX/DR IN RCRD: CPT | Mod: CPTII,S$GLB,, | Performed by: STUDENT IN AN ORGANIZED HEALTH CARE EDUCATION/TRAINING PROGRAM

## 2025-05-27 PROCEDURE — 99999 PR PBB SHADOW E&M-EST. PATIENT-LVL III: CPT | Mod: PBBFAC,,, | Performed by: STUDENT IN AN ORGANIZED HEALTH CARE EDUCATION/TRAINING PROGRAM

## 2025-05-27 PROCEDURE — 3008F BODY MASS INDEX DOCD: CPT | Mod: CPTII,S$GLB,, | Performed by: STUDENT IN AN ORGANIZED HEALTH CARE EDUCATION/TRAINING PROGRAM

## 2025-05-27 PROCEDURE — 99204 OFFICE O/P NEW MOD 45 MIN: CPT | Mod: S$GLB,,, | Performed by: STUDENT IN AN ORGANIZED HEALTH CARE EDUCATION/TRAINING PROGRAM

## 2025-05-27 RX ORDER — FLUTICASONE PROPIONATE 50 MCG
2 SPRAY, SUSPENSION (ML) NASAL
COMMUNITY
Start: 2022-12-29

## 2025-05-27 NOTE — PROGRESS NOTES
ALLERGY & IMMUNOLOGY CLINIC - INITIAL CONSULTATION      HISTORY OF PRESENT ILLNESS     Patient ID: Luis Andujar is a 39 y.o. male    CC: chronic rhinosinusitis with nasal polyposis    HPI: Luis Andujar is a 39 y.o. male with a history of primary hyperparathyroidism (s/p parathyroidectomy), presenting for chronic rhinosinusitis with nasal polyposis.   Patient was referred by Kimmie Rdz MD (ENT).    Symptoms for at least 5 years.   He went to an ENT clinic in 2024, told he had very large polyps. Started dupixent. But then he needed a parathyroidectomy, so he focused on that treating his nasal polyps with dupixent fell through the cracks. He thinks he was on dupixent for about 4 months.   He did notice improvement on dupixent. Said he previously couldn't breathe through left nostril. Nasal drip was better on dupixent. Could breathe easier through nose.  He recalls allergy test (bloodwork), was told he is allergic to mold. He was never told that he has AFS. He doesn't get thick brown mucus (its mostly yellow and clear).   He started saline sinus rinses recently which help. Says he hasn't been able to get the budesonide yet.  He is on flonase 2 SEN daily.  He stopped using azelastine nasal spray due to burning sensation.   He thinks he has tried nasonex and nasacort, says they were helpful as flonase. He reports he has taken oral steroids for the polyps.   He says he didn't have side effects from dupixent, and says it worked very well for him.   He says he has a lot of congestion in the nose, he feels very uncomfortable due to it.  He says he has hyposmia and dysgeusia.  He feels fatigued.  No wheezing or shortness of breath stemming from the chest.      MEDICAL HISTORY     Vaccines:   Immunization History   Administered Date(s) Administered    COVID-19, MRNA, LN-S, PF (Pfizer) (Purple Cap) 03/07/2021, 03/28/2021, 10/22/2021    COVID-19, mRNA, LNP-S, bivalent booster, PF (Moderna Omicron)12 + YEARS  "09/12/2022    COVID-19, mRNA, LNP-S, bivalent booster, PF (PFIZER OMICRON) 08/27/2023    Hepatitis A, Adult 11/17/2019    Influenza 10/26/2007, 09/30/2013, 09/11/2014, 09/08/2021, 08/11/2022    Influenza - Quadrivalent 09/06/2019    Influenza - Quadrivalent - PF *Preferred* (6 months and older) 09/30/2013, 09/11/2014, 08/24/2017, 10/10/2018, 09/08/2020, 08/15/2022    Influenza - Trivalent - Fluarix, Flulaval, Fluzone, Afluria - PF 10/28/2015    Tdap 11/17/2019, 09/12/2022     Medical Hx:   Problem List[1]    Surgical Hx:   Past Surgical History:   Procedure Laterality Date    PARATHYROIDECTOMY Right 8/16/2024    Procedure: PARATHYROIDECTOMY;  Surgeon: Kimmie Washburn MD;  Location: [facility] OR [address] [place] [address];  Service: General;  Laterality: Right;     Medications:   Medications Ordered Prior to Encounter[2]    H/o Asthma: denies  H/o Rhinitis: endorses    Drug Allergies:   Review of patient's allergies indicates:   Allergen Reactions    Mold Hives     Env/Occ:   Pets: 2 cats, don't seem to trigger symptoms     Social Hx:   Social History[3]    Family Hx:   Asthma: grandmother  No family history on file.     PHYSICAL EXAM     VS: Ht 5' 8" (1.727 m)   Wt 107.6 kg (237 lb 3.4 oz)   BMI 36.07 kg/m²   GENERAL: Alert, NAD, well-appearing  EYES: EOMI, no conjunctival injection, no discharge, no infraorbital shiners  NOSE: NT 2+ B/L, no stringing mucus, polyps visualized bilaterally  ORAL: MMM, no ulcers, no thrush  LUNGS: CTAB, no w/r/c, no increased WOB  HEART: RRR, normal S1/S2, no m/g/r  DERM: no rashes     LABORATORY STUDIES     Component      Latest Ref Rng 6/25/2024 8/26/2024 2/4/2025   WBC      3.90 - 12.70 K/uL 8.82      RBC      4.60 - 6.20 M/uL 5.29      Hemoglobin      14.0 - 18.0 g/dL 14.1      Hematocrit      40.0 - 54.0 % 43.6      MCV      82 - 98 fL 82      MCH      27.0 - 31.0 pg 26.7 (L)      MCHC      32.0 - 36.0 g/dL 32.3      RDW      11.5 - 14.5 % 12.8      Platelet Count      150 - 450 K/uL 255      MPV    "   9.2 - 12.9 fL 11.1      Immature Granulocytes      0.0 - 0.5 % 0.2      Gran # (ANC)      1.8 - 7.7 K/uL 5.5      Immature Grans (Abs)      0.00 - 0.04 K/uL 0.02      Lymph #      1.0 - 4.8 K/uL 2.3      Mono #      0.3 - 1.0 K/uL 0.5      Eos #      0.0 - 0.5 K/uL 0.4      Baso #      0.00 - 0.20 K/uL 0.05      Differential Method Automated      Glucose      70 - 110 mg/dL 109  101  104    Glucose        104     Sodium      136 - 145 mmol/L 139  138  142    Sodium        138     Potassium      3.5 - 5.1 mmol/L 4.0  4.0  4.5    Potassium        4.1     Chloride      95 - 110 mmol/L 106  105  107    Chloride        105     CO2      23 - 29 mmol/L 24  23  25    CO2        24     BUN      6 - 20 mg/dL 15  10  12    BUN        10     Calcium      8.7 - 10.5 mg/dL 10.0  9.6  9.6    Calcium        9.4     Creatinine      0.5 - 1.4 mg/dL 1.0  0.9  0.9    Creatinine        1.0     Albumin      3.5 - 5.2 g/dL 4.0  3.9  4.1    Phosphorus Level      2.7 - 4.5 mg/dL 2.6 (L)  3.0  2.8    eGFR      >60 mL/min/1.73 m^2 >60  >60.0  >60    eGFR        >60.0     Anion Gap      8 - 16 mmol/L 9  10  10    Anion Gap        9     Hemoglobin A1C External      4.0 - 5.6 %   5.5       ALLERGEN TESTING     Immunocaps:   2/2/24 - patient has records on his phone. Specific values aren't given through this particular portal, but it says the immunocaps were positive for mold (with the rest of the aeroallergen panels being negative).     CHART REVIEW     Reviewed ENT note, labs.      ASSESSMENT & PLAN     Luis Andujar is a 39 y.o. male with     # Chronic rhinosinusitis with nasal polyposis: Patient was on dupixent for about 4 months in 2024, but stopped to focus on unrelated health concerns. He deals with congestion, drainage, hyposmia, and dysgeusia; all were significantly improved when he was on dupixent. Prior immunocaps only positive for allergy to mold. Patient is currently on flonase with plans to switch to budesonide sinus rinses. In  the past has tried nasonex, nasacort, and oral steroids. Azelastine nasal spray caused burning sensation. Patient comes in today with the hopes of getting back on dupixent.  -restart dupixent 300 mg subQ pen q14 days. Prescription sent to [facility].  -continue saline sinus rinses (with plans to add budesonide once he receives it) per ENT.   -in the meantime, continue flonase.  *Update regarding insurance requirements for dupixent: Based on chart review, patient has been on flonase for years. And in the past, had tried nasacort for years. It appears he has had multiple courses of oral steroids. One criteria per his insurance is that an oral steroid be tried for 3 months, but I would recommend against this given the patient's history of hypercholesterolemia and hyperlipidemia. Dupixent remains the best option for this patient.       Follow up: 3-4 months     I spent a total of 55 minutes on the day of the visit.  This includes face to face time and non-face to face time preparing to see the patient (eg, review of tests), obtaining and/or reviewing separately obtained history, documenting clinical information in the electronic or other health record.    Christy Oswald MD  Allergy/Immunology         [1]   Patient Active Problem List  Diagnosis    Primary hyperparathyroidism    S/P parathyroidectomy    Hypercholesterolemia   [2]   Current Outpatient Medications on File Prior to Visit   Medication Sig Dispense Refill    ARIPiprazole (ABILIFY) 20 MG Tab Take 1 tablet (20 mg total) by mouth once daily. 90 tablet 3    budesonide (PULMICORT) 0.5 mg/2 mL nebulizer solution Put a 2 ml vial in one bottle of neilmed sinus rinse and use half on one side of nose and half on the other side of the nose. Rinse mouth out water and spit out after each use 180 mL 3    fluticasone propionate (FLONASE) 50 mcg/actuation nasal spray 2 sprays by Each Nostril route as needed for Rhinitis or Allergies.      sertraline (ZOLOFT)  100 MG tablet Take 2 tablets (200 mg total) by mouth once daily. 180 tablet 3     No current facility-administered medications on file prior to visit.   [3]   Social History  Tobacco Use    Smoking status: Never    Smokeless tobacco: Never   Substance Use Topics    Alcohol use: Never    Drug use: No

## 2025-05-28 PROBLEM — J33.9 NASAL POLYPS: Status: ACTIVE | Noted: 2025-05-28

## 2025-06-03 ENCOUNTER — TELEPHONE (OUTPATIENT)
Dept: ALLERGY | Facility: CLINIC | Age: 40
End: 2025-06-03
Payer: COMMERCIAL

## 2025-06-07 DIAGNOSIS — J32.2 CHRONIC ETHMOIDAL SINUSITIS: Primary | ICD-10-CM

## 2025-06-11 RX ORDER — BUDESONIDE 0.5 MG/2ML
INHALANT ORAL
Qty: 180 ML | Refills: 3 | Status: SHIPPED | OUTPATIENT
Start: 2025-06-11

## 2025-06-23 ENCOUNTER — PATIENT MESSAGE (OUTPATIENT)
Dept: ADMINISTRATIVE | Facility: OTHER | Age: 40
End: 2025-06-23
Payer: COMMERCIAL

## 2025-07-17 ENCOUNTER — OFFICE VISIT (OUTPATIENT)
Dept: PSYCHIATRY | Facility: CLINIC | Age: 40
End: 2025-07-17
Payer: COMMERCIAL

## 2025-07-17 DIAGNOSIS — F25.1 SCHIZOAFFECTIVE DISORDER, DEPRESSIVE TYPE: Primary | ICD-10-CM

## 2025-07-17 DIAGNOSIS — F41.9 ANXIETY: ICD-10-CM

## 2025-07-17 PROCEDURE — 3044F HG A1C LEVEL LT 7.0%: CPT | Mod: CPTII,95,, | Performed by: PHYSICIAN ASSISTANT

## 2025-07-17 PROCEDURE — 98006 SYNCH AUDIO-VIDEO EST MOD 30: CPT | Mod: 95,,, | Performed by: PHYSICIAN ASSISTANT

## 2025-07-17 PROCEDURE — 1160F RVW MEDS BY RX/DR IN RCRD: CPT | Mod: CPTII,95,, | Performed by: PHYSICIAN ASSISTANT

## 2025-07-17 PROCEDURE — 1159F MED LIST DOCD IN RCRD: CPT | Mod: CPTII,95,, | Performed by: PHYSICIAN ASSISTANT

## 2025-07-17 PROCEDURE — G2211 COMPLEX E/M VISIT ADD ON: HCPCS | Mod: 95,,, | Performed by: PHYSICIAN ASSISTANT

## 2025-07-17 RX ORDER — OLANZAPINE 2.5 MG/1
2.5 TABLET, FILM COATED ORAL NIGHTLY
Qty: 90 TABLET | Refills: 0 | Status: SHIPPED | OUTPATIENT
Start: 2025-07-17 | End: 2025-10-15

## 2025-07-17 NOTE — PROGRESS NOTES
The patient location is: At home in [place]  The chief complaint leading to consultation is: f/u    Visit type: audiovisual    Face to Face time with patient: 12 min  17 minutes of total time spent on the encounter, which includes face to face time and non-face to face time preparing to see the patient (eg, review of tests), Obtaining and/or reviewing separately obtained history, Documenting clinical information in the electronic or other health record, Independently interpreting results (not separately reported) and communicating results to the patient/family/caregiver, or Care coordination (not separately reported).         Each patient to whom he or she provides medical services by telemedicine is:  (1) informed of the relationship between the physician and patient and the respective role of any other health care provider with respect to management of the patient; and (2) notified that he or she may decline to receive medical services by telemedicine and may withdraw from such care at any time.    Notes:     ICD-10-CM ICD-9-CM    1. Schizoaffective disorder, depressive type  F25.1 295.70       2. Anxiety  F41.9 300.00         Patient ID: Luis Andujar is a 39 y.o. male.    Chief Complaint: No chief complaint on file.    History of Present Illness    HPI:  Mr. Andujar reports experiencing severe withdrawal symptoms after discontinuing Zyprexa (olanzapine), including psychotic thoughts, sleep disturbance, auditory hallucinations, and difficulty concentrating. He was unable to focus on tasks due to the absence of Zyprexa in his system, although he has been off of the very low dose of Zyprexa for months, and initially did very well for a period of weeks.    Mr. Andujar was previously on a 2.5 mg dose of Zyprexa. He is currently taking Zoloft and Abilify, reporting an adequate supply of Zoloft and potentially enough Abilify due to an excess amount provided by the insurance company.    Mr. Andujar's father is  "currently away visiting sister, leaving him alone with his mother. He expresses feeling less worried and more able to be himself with fewer people around. He describes his father as somewhat judgmental, occasionally yelling, and questioning his motives. Mr. Andujar mentions feeling criticized by his father for not being as successful as other family members, which impacts his self-esteem.    Mr. Andujar reports trying to do housework and mentions having "a lot of stuff going on right now." He recently had a deficiency dependent put in his arm, which he states is helping with his nose issues. Mr. Andujar is also focusing on doing chores and improving his self-care.    Mr. Andujar has a history of normal hemoglobin A1C and mildly elevated lipid panel, with follow-up labs ordered and managed by his PCP.    FAMILY HISTORY:  Family history is significant for father, who is a  and described as judgmental. His mother is described as less judgmental than his father. Other family members are mentioned as being more successful than the patient.    LIFESTYLE:  Mr. Andujar is currently living with his mother. His father is temporarily away visiting his sister. He is trying to do housework and chores as part of his daily activities.      ROS:  Psychiatric: -anxiety, +sleep difficulty, +delusions, +thought insertion, +hallucinations, +sleep disturbances, +lack of focus/concentration    All other review of systems negative unless otherwise noted in the HPI.         Physical Exam    Appearance: Appears stated age. Well-groomed. Well-nourished.  Speech:  Normal volume. Somewhat choppy and very short sentences.  Affect: Appropriate.  Thought Content: No evidence of aggression. No evidence of homicidal ideation. No evidence of homicidal plan. No evidence of homicidal intent. No evidence of suicidal ideation. No evidence of suicidal plan. No evidence of suicidal intent. No evidence of delusions.  Memory: Recent memory intact. " Remote memory intact.  Behavior: Cooperative. Good eye contact. Engaged. Pleasant.  Mood: Euthymic.  Thought Form: Linear thinking. Goal oriented and directed.  Perception: No perceptual abnormalities noted.  Judgement: Intact as evidenced by decision making in the recent past.  Insight: Good insight into symptoms. Good insight into treatment options.  Cognition: A&Ox3. Normal attention span. Average fund of knowledge.  Motor: No gross motor abnormalities.  LABS:  Mr. Andujar's past hemoglobin A1C test results were normal. His past lipid panel showed mildly elevated levels.    Examination findings limited by telemedicine platform.         Assessment & Plan    IMPRESSION:  Restarted olanzapine 2.5 mg daily due to reported psychotic symptoms and sleep disturbance after discontinuation, to be filled through [facility] mail order.  Considered combining antipsychotics (olanzapine with existing Abilify) despite typical practice, based on reported improvement with olanzapine.  Evaluated home situation and relationship with parents, noting improved well-being with father's absence.    PSYCHOTIC DISORDER:  1. Restarted olanzapine 2.5 mg daily due to reported psychotic symptoms and sleep disturbance after discontinuation, to be filled through [facility] mail order.    FOLLOW-UP:  1. Follow up on August 21st at 11:00 AM to assess response to restarted olanzapine.  2. Use the portal to schedule earlier appointments if needed.           This note was generated with the assistance of ambient listening technology. Verbal consent was obtained by the patient and accompanying visitor(s) for the recording of patient appointment to facilitate this note. I attest to having reviewed and edited the generated note for accuracy, though some syntax or spelling errors may persist. Please contact the author of this note for any clarification.          ICD-10-CM ICD-9-CM    1. Schizoaffective disorder, depressive type  F25.1 295.70       2.  Anxiety  F41.9 300.00              Visit today included increased complexity associated with the care of the episodic problem schizoaffective disorder addressed and managing the longitudinal care of the patient due to the serious and/or complex managed problem(s) diagnoses as listed above.

## 2025-07-29 RX ORDER — ARIPIPRAZOLE 20 MG/1
20 TABLET ORAL DAILY
Qty: 90 TABLET | Refills: 3 | Status: SHIPPED | OUTPATIENT
Start: 2025-07-29 | End: 2026-07-29

## 2025-08-04 ENCOUNTER — OFFICE VISIT (OUTPATIENT)
Dept: PSYCHIATRY | Facility: CLINIC | Age: 40
End: 2025-08-04
Payer: COMMERCIAL

## 2025-08-04 DIAGNOSIS — F41.9 ANXIETY: ICD-10-CM

## 2025-08-04 DIAGNOSIS — F25.1 SCHIZOAFFECTIVE DISORDER, DEPRESSIVE TYPE: Primary | ICD-10-CM

## 2025-08-04 PROCEDURE — 3044F HG A1C LEVEL LT 7.0%: CPT | Mod: CPTII,95,, | Performed by: PHYSICIAN ASSISTANT

## 2025-08-04 PROCEDURE — 1160F RVW MEDS BY RX/DR IN RCRD: CPT | Mod: CPTII,95,, | Performed by: PHYSICIAN ASSISTANT

## 2025-08-04 PROCEDURE — 1159F MED LIST DOCD IN RCRD: CPT | Mod: CPTII,95,, | Performed by: PHYSICIAN ASSISTANT

## 2025-08-04 PROCEDURE — 98006 SYNCH AUDIO-VIDEO EST MOD 30: CPT | Mod: 95,,, | Performed by: PHYSICIAN ASSISTANT

## 2025-08-04 PROCEDURE — G2211 COMPLEX E/M VISIT ADD ON: HCPCS | Mod: 95,,, | Performed by: PHYSICIAN ASSISTANT

## 2025-08-04 RX ORDER — OLANZAPINE 2.5 MG/1
2.5 TABLET, FILM COATED ORAL NIGHTLY
Qty: 90 TABLET | Refills: 1 | Status: SHIPPED | OUTPATIENT
Start: 2025-08-04 | End: 2026-01-31

## 2025-08-04 RX ORDER — SERTRALINE HYDROCHLORIDE 100 MG/1
200 TABLET, FILM COATED ORAL DAILY
Qty: 180 TABLET | Refills: 1 | Status: SHIPPED | OUTPATIENT
Start: 2025-08-04 | End: 2026-01-31

## 2025-08-04 NOTE — PROGRESS NOTES
The patient location is: At home in [place]  The chief complaint leading to consultation is: F/u    Visit type: audiovisual    Face to Face time with patient: 5 min  10 minutes of total time spent on the encounter, which includes face to face time and non-face to face time preparing to see the patient (eg, review of tests), Obtaining and/or reviewing separately obtained history, Documenting clinical information in the electronic or other health record, Independently interpreting results (not separately reported) and communicating results to the patient/family/caregiver, or Care coordination (not separately reported).         Each patient to whom he or she provides medical services by telemedicine is:  (1) informed of the relationship between the physician and patient and the respective role of any other health care provider with respect to management of the patient; and (2) notified that he or she may decline to receive medical services by telemedicine and may withdraw from such care at any time.    Notes:     ICD-10-CM ICD-9-CM    1. Schizoaffective disorder, depressive type  F25.1 295.70       2. Anxiety  F41.9 300.00 sertraline (ZOLOFT) 100 MG tablet        Patient ID: Luis Andujar is a 39 y.o. male.    Chief Complaint: No chief complaint on file.    History of Present Illness    MEDICATIONS:  Mr. Andujar is on Zyprexa 2.5 mg orally for sleep and managing OCD/paranoia, helping him sleep and manage stress. He is also on Abilify 20 mg daily orally, Zoloft (sertraline) 200 mg daily orally.    HPI:  Mr. Andujar reports improvement with the reintroduction of Zyprexa, stating it helps him sleep better and manage symptoms effectively without side effects. He indicates that Zyprexa is controlling his paranoia, which he attributes to possibly withdrawing from the medication when it was discontinued earlier. Mr. Andujar reports improved sleep quality and increased ability to handle daily tasks and chores, mentioning he  "has been doing "a lot of stuff like chores" in the past couple of months. Despite having a stressful week, he feels Zyprexa is helping him manage stress better. Mr. Andujar confirms he is continuing to take his other prescribed medications, including Abilify 20 mg daily and Zoloft (sertraline) 200 mg daily, in addition to the recently reintroduced Zyprexa.    ALLERGIES:  No allergies are reported.    LIFESTYLE:  Mr. Andujar has been doing a lot of chores in the past couple of months, with little time to relax or unwind. No information is provided about his family or social relationships.      ROS:  Psychiatric: +increased stressors    All other review of systems negative unless otherwise noted in the HPI.         Physical Exam    Appearance: Appears stated age. Well-groomed. Well-nourished.  Speech: Normal rate. Normal volume. Spontaneous and fluid.  Affect: Appropriate.  Thought Content: No evidence of aggression. No evidence of homicidal ideation. No evidence of homicidal plan. No evidence of homicidal intent. No evidence of suicidal ideation. No evidence of suicidal plan. No evidence of suicidal intent. No evidence of delusions.  Memory: Recent memory intact. Remote memory intact.  Behavior: Cooperative. Good eye contact. Engaged. Pleasant.  Mood: Euthymic.  Thought Form: Linear thinking. Goal oriented and directed.  Perception: No perceptual abnormalities noted.  Judgement: Intact as evidenced by decision making in the recent past.  Insight: Good insight into symptoms. Good insight into treatment options.  Cognition: A&Ox3. Normal attention span. Average fund of knowledge.  Motor: No gross motor abnormalities.    Examination findings limited by telemedicine platform.         Assessment & Plan    IMPRESSION:  Assessed response to restarted Zyprexa 2.5 mg nightly, noting improved sleep and management of sleep and paranoia symptoms.  Considered balance of using low-dose Zyprexa in combination with Abilify, " acknowledging benefit of Abilify's lower impact on glucose and cholesterol.  Evaluated current medication regimen, including Abilify, Zoloft, and Zyprexa, determining continuation based on positive response and lack of side effects.    PSYCHIATRIC DISORDERS:  1. Continued Zyprexa at 2.5 mg nightly, Abilify 20 mg daily, and Zoloft (sertraline) 200 mg daily.    FOLLOW-UP:  1. Follow up on November 4th at 2:00 PM as scheduled.            This note was generated with the assistance of ambient listening technology. Verbal consent was obtained by the patient and accompanying visitor(s) for the recording of patient appointment to facilitate this note. I attest to having reviewed and edited the generated note for accuracy, though some syntax or spelling errors may persist. Please contact the author of this note for any clarification.          ICD-10-CM ICD-9-CM    1. Schizoaffective disorder, depressive type  F25.1 295.70       2. Anxiety  F41.9 300.00 sertraline (ZOLOFT) 100 MG tablet             Visit today included increased complexity associated with the care of the episodic problem schizoaffective disorder addressed and managing the longitudinal care of the patient due to the serious and/or complex managed problem(s) diagnoses as listed above.

## 2025-08-15 ENCOUNTER — PATIENT MESSAGE (OUTPATIENT)
Dept: PSYCHIATRY | Facility: CLINIC | Age: 40
End: 2025-08-15
Payer: COMMERCIAL

## (undated) DEVICE — DRAPE EENT SPLIT STERILE

## (undated) DEVICE — ELECTRODE BLADE INSULATED 1 IN

## (undated) DEVICE — NDL HYPO REG 25G X 1 1/2

## (undated) DEVICE — GAUZE SPONGE PEANUT STRL

## (undated) DEVICE — ELECTRODE REM PLYHSV RETURN 9

## (undated) DEVICE — DRESSING TRANS 4X4 TEGADERM

## (undated) DEVICE — SUT VICRYL 3-0 27 SH

## (undated) DEVICE — PAD CURAD NONADH 3X4IN

## (undated) DEVICE — SUT 3-0 12-18IN SILK

## (undated) DEVICE — CHLORAPREP 10.5 ML APPLICATOR

## (undated) DEVICE — CLIP LIGACLIP XTRA TITANIUM

## (undated) DEVICE — ADHESIVE DERMABOND ADVANCED

## (undated) DEVICE — TRAY MINOR GEN SURG OMC

## (undated) DEVICE — PROBE SIMULATOR KRAFF

## (undated) DEVICE — MARKER SKIN RULER STERILE

## (undated) DEVICE — DRAPE HALF SURGICAL 40X58IN

## (undated) DEVICE — DRAPE CORETEMP FLD WRM 56X62IN

## (undated) DEVICE — SUT VICRYL 6-0 P1 18IN UD

## (undated) DEVICE — SUT LIGACLIP SMALL XTRA

## (undated) DEVICE — SUT 4-0 12-18IN SILK BLACK

## (undated) DEVICE — CONTAINER SPECIMEN OR STER 4OZ

## (undated) DEVICE — CORD BIPOLAR 12 FOOT

## (undated) DEVICE — DRAPE INSTR MAGNETIC 10X16IN

## (undated) DEVICE — DRAPE STERI INSTRUMENT 1018